# Patient Record
Sex: MALE | Race: BLACK OR AFRICAN AMERICAN | NOT HISPANIC OR LATINO | Employment: FULL TIME | URBAN - METROPOLITAN AREA
[De-identification: names, ages, dates, MRNs, and addresses within clinical notes are randomized per-mention and may not be internally consistent; named-entity substitution may affect disease eponyms.]

---

## 2017-04-06 ENCOUNTER — ALLSCRIPTS OFFICE VISIT (OUTPATIENT)
Dept: OTHER | Facility: OTHER | Age: 62
End: 2017-04-06

## 2017-04-06 DIAGNOSIS — R73.01 IMPAIRED FASTING GLUCOSE: ICD-10-CM

## 2017-04-06 DIAGNOSIS — K62.5 HEMORRHAGE OF ANUS AND RECTUM: ICD-10-CM

## 2017-04-06 DIAGNOSIS — D64.9 ANEMIA: ICD-10-CM

## 2017-04-06 DIAGNOSIS — R63.4 ABNORMAL WEIGHT LOSS: ICD-10-CM

## 2017-04-06 DIAGNOSIS — Z12.5 ENCOUNTER FOR SCREENING FOR MALIGNANT NEOPLASM OF PROSTATE: ICD-10-CM

## 2017-04-06 DIAGNOSIS — R68.81 EARLY SATIETY: ICD-10-CM

## 2017-04-06 DIAGNOSIS — R79.89 OTHER SPECIFIED ABNORMAL FINDINGS OF BLOOD CHEMISTRY: ICD-10-CM

## 2017-04-06 DIAGNOSIS — A04.8 OTHER SPECIFIED BACTERIAL INTESTINAL INFECTIONS: ICD-10-CM

## 2017-04-10 ENCOUNTER — TRANSCRIBE ORDERS (OUTPATIENT)
Dept: ADMINISTRATIVE | Facility: HOSPITAL | Age: 62
End: 2017-04-10

## 2017-04-10 ENCOUNTER — APPOINTMENT (OUTPATIENT)
Dept: LAB | Facility: MEDICAL CENTER | Age: 62
End: 2017-04-10
Payer: COMMERCIAL

## 2017-04-10 ENCOUNTER — ALLSCRIPTS OFFICE VISIT (OUTPATIENT)
Dept: OTHER | Facility: OTHER | Age: 62
End: 2017-04-10

## 2017-04-10 DIAGNOSIS — R63.4 ABNORMAL WEIGHT LOSS: ICD-10-CM

## 2017-04-10 DIAGNOSIS — R73.01 IMPAIRED FASTING GLUCOSE: ICD-10-CM

## 2017-04-10 DIAGNOSIS — R68.81 EARLY SATIETY: ICD-10-CM

## 2017-04-10 DIAGNOSIS — Z12.5 ENCOUNTER FOR SCREENING FOR MALIGNANT NEOPLASM OF PROSTATE: ICD-10-CM

## 2017-04-10 DIAGNOSIS — K62.5 HEMORRHAGE OF ANUS AND RECTUM: ICD-10-CM

## 2017-04-10 LAB
ALBUMIN SERPL BCP-MCNC: 3.3 G/DL (ref 3.5–5)
ALP SERPL-CCNC: 87 U/L (ref 46–116)
ALT SERPL W P-5'-P-CCNC: 27 U/L (ref 12–78)
ANION GAP SERPL CALCULATED.3IONS-SCNC: 3 MMOL/L (ref 4–13)
AST SERPL W P-5'-P-CCNC: 15 U/L (ref 5–45)
BASOPHILS # BLD MANUAL: 0.13 THOUSAND/UL (ref 0–0.1)
BASOPHILS NFR MAR MANUAL: 2 % (ref 0–1)
BILIRUB SERPL-MCNC: 0.7 MG/DL (ref 0.2–1)
BUN SERPL-MCNC: 11 MG/DL (ref 5–25)
CALCIUM SERPL-MCNC: 10 MG/DL (ref 8.3–10.1)
CHLORIDE SERPL-SCNC: 105 MMOL/L (ref 100–108)
CO2 SERPL-SCNC: 31 MMOL/L (ref 21–32)
CREAT SERPL-MCNC: 1.51 MG/DL (ref 0.6–1.3)
EOSINOPHIL # BLD MANUAL: 0.26 THOUSAND/UL (ref 0–0.4)
EOSINOPHIL NFR BLD MANUAL: 4 % (ref 0–6)
ERYTHROCYTE [DISTWIDTH] IN BLOOD BY AUTOMATED COUNT: 15 % (ref 11.6–15.1)
GFR SERPL CREATININE-BSD FRML MDRD: 57.4 ML/MIN/1.73SQ M
GLUCOSE P FAST SERPL-MCNC: 93 MG/DL (ref 65–99)
HCT VFR BLD AUTO: 39.6 % (ref 36.5–49.3)
HGB BLD-MCNC: 12.9 G/DL (ref 12–17)
LYMPHOCYTES # BLD AUTO: 2.06 THOUSAND/UL (ref 0.6–4.47)
LYMPHOCYTES # BLD AUTO: 32 % (ref 14–44)
MCH RBC QN AUTO: 26.9 PG (ref 26.8–34.3)
MCHC RBC AUTO-ENTMCNC: 32.6 G/DL (ref 31.4–37.4)
MCV RBC AUTO: 83 FL (ref 82–98)
MONOCYTES # BLD AUTO: 1.1 THOUSAND/UL (ref 0–1.22)
MONOCYTES NFR BLD: 17 % (ref 4–12)
NEUTROPHILS # BLD MANUAL: 2.64 THOUSAND/UL (ref 1.85–7.62)
NEUTS SEG NFR BLD AUTO: 41 % (ref 43–75)
NRBC BLD AUTO-RTO: 0 /100 WBCS
PLATELET # BLD AUTO: 221 THOUSANDS/UL (ref 149–390)
PLATELET BLD QL SMEAR: ADEQUATE
PMV BLD AUTO: 10 FL (ref 8.9–12.7)
POTASSIUM SERPL-SCNC: 4 MMOL/L (ref 3.5–5.3)
PROT SERPL-MCNC: 7 G/DL (ref 6.4–8.2)
PSA SERPL-MCNC: 0.3 NG/ML (ref 0–4)
RBC # BLD AUTO: 4.8 MILLION/UL (ref 3.88–5.62)
RBC MORPH BLD: NORMAL
SODIUM SERPL-SCNC: 139 MMOL/L (ref 136–145)
TSH SERPL DL<=0.05 MIU/L-ACNC: 0.55 UIU/ML (ref 0.36–3.74)
VARIANT LYMPHS # BLD AUTO: 4 %
WBC # BLD AUTO: 6.45 THOUSAND/UL (ref 4.31–10.16)

## 2017-04-10 PROCEDURE — 85007 BL SMEAR W/DIFF WBC COUNT: CPT

## 2017-04-10 PROCEDURE — 84443 ASSAY THYROID STIM HORMONE: CPT

## 2017-04-10 PROCEDURE — 85027 COMPLETE CBC AUTOMATED: CPT

## 2017-04-10 PROCEDURE — 80053 COMPREHEN METABOLIC PANEL: CPT

## 2017-04-10 PROCEDURE — 36415 COLL VENOUS BLD VENIPUNCTURE: CPT

## 2017-04-10 PROCEDURE — G0103 PSA SCREENING: HCPCS

## 2017-04-12 ENCOUNTER — GENERIC CONVERSION - ENCOUNTER (OUTPATIENT)
Dept: OTHER | Facility: OTHER | Age: 62
End: 2017-04-12

## 2017-04-12 RX ORDER — OMEPRAZOLE 20 MG/1
20 TABLET, DELAYED RELEASE ORAL AS NEEDED
COMMUNITY
End: 2018-06-05

## 2017-04-17 ENCOUNTER — HOSPITAL ENCOUNTER (OUTPATIENT)
Facility: MEDICAL CENTER | Age: 62
Setting detail: OUTPATIENT SURGERY
Discharge: HOME | End: 2017-04-17
Attending: INTERNAL MEDICINE | Admitting: INTERNAL MEDICINE
Payer: COMMERCIAL

## 2017-04-17 ENCOUNTER — ANESTHESIA EVENT (OUTPATIENT)
Dept: GASTROENTEROLOGY | Facility: MEDICAL CENTER | Age: 62
End: 2017-04-17
Payer: COMMERCIAL

## 2017-04-17 ENCOUNTER — ANESTHESIA (OUTPATIENT)
Dept: GASTROENTEROLOGY | Facility: MEDICAL CENTER | Age: 62
End: 2017-04-17
Payer: COMMERCIAL

## 2017-04-17 ENCOUNTER — GENERIC CONVERSION - ENCOUNTER (OUTPATIENT)
Dept: GASTROENTEROLOGY | Facility: MEDICAL CENTER | Age: 62
End: 2017-04-17

## 2017-04-17 VITALS
HEART RATE: 60 BPM | DIASTOLIC BLOOD PRESSURE: 66 MMHG | BODY MASS INDEX: 22.46 KG/M2 | RESPIRATION RATE: 16 BRPM | HEIGHT: 74 IN | TEMPERATURE: 96.6 F | OXYGEN SATURATION: 99 % | WEIGHT: 175 LBS | SYSTOLIC BLOOD PRESSURE: 105 MMHG

## 2017-04-17 DIAGNOSIS — K21.9 GASTRO-ESOPHAGEAL REFLUX DISEASE WITHOUT ESOPHAGITIS: ICD-10-CM

## 2017-04-17 DIAGNOSIS — K63.5 POLYP OF COLON: ICD-10-CM

## 2017-04-17 PROCEDURE — 88305 TISSUE EXAM BY PATHOLOGIST: CPT | Performed by: INTERNAL MEDICINE

## 2017-04-17 PROCEDURE — 88112 CYTOPATH CELL ENHANCE TECH: CPT | Performed by: INTERNAL MEDICINE

## 2017-04-17 PROCEDURE — 88342 IMHCHEM/IMCYTCHM 1ST ANTB: CPT | Performed by: INTERNAL MEDICINE

## 2017-04-17 PROCEDURE — 88313 SPECIAL STAINS GROUP 2: CPT | Performed by: INTERNAL MEDICINE

## 2017-04-17 RX ORDER — PROPOFOL 10 MG/ML
INJECTION, EMULSION INTRAVENOUS CONTINUOUS PRN
Status: DISCONTINUED | OUTPATIENT
Start: 2017-04-17 | End: 2017-04-17 | Stop reason: SURG

## 2017-04-17 RX ORDER — PROPOFOL 10 MG/ML
INJECTION, EMULSION INTRAVENOUS AS NEEDED
Status: DISCONTINUED | OUTPATIENT
Start: 2017-04-17 | End: 2017-04-17 | Stop reason: SURG

## 2017-04-17 RX ORDER — SODIUM CHLORIDE 9 MG/ML
125 INJECTION, SOLUTION INTRAVENOUS CONTINUOUS
Status: DISCONTINUED | OUTPATIENT
Start: 2017-04-17 | End: 2017-04-17 | Stop reason: HOSPADM

## 2017-04-17 RX ORDER — ONDANSETRON 2 MG/ML
4 INJECTION INTRAMUSCULAR; INTRAVENOUS ONCE
Status: DISCONTINUED | OUTPATIENT
Start: 2017-04-17 | End: 2017-04-17 | Stop reason: HOSPADM

## 2017-04-17 RX ADMIN — SODIUM CHLORIDE 125 ML/HR: 0.9 INJECTION, SOLUTION INTRAVENOUS at 10:16

## 2017-04-17 RX ADMIN — PROPOFOL 50 MG: 10 INJECTION, EMULSION INTRAVENOUS at 10:45

## 2017-04-17 RX ADMIN — PROPOFOL 50 MG: 10 INJECTION, EMULSION INTRAVENOUS at 10:46

## 2017-04-17 RX ADMIN — PROPOFOL 50 MG: 10 INJECTION, EMULSION INTRAVENOUS at 10:47

## 2017-04-17 RX ADMIN — PROPOFOL 140 MCG/KG/MIN: 10 INJECTION, EMULSION INTRAVENOUS at 10:45

## 2017-04-17 RX ADMIN — LIDOCAINE HYDROCHLORIDE 60 MG: 20 INJECTION, SOLUTION INTRAVENOUS at 10:45

## 2017-04-22 ENCOUNTER — GENERIC CONVERSION - ENCOUNTER (OUTPATIENT)
Dept: OTHER | Facility: OTHER | Age: 62
End: 2017-04-22

## 2017-05-09 ENCOUNTER — APPOINTMENT (OUTPATIENT)
Dept: LAB | Facility: MEDICAL CENTER | Age: 62
End: 2017-05-09
Payer: COMMERCIAL

## 2017-05-09 DIAGNOSIS — D64.9 ANEMIA: ICD-10-CM

## 2017-05-09 DIAGNOSIS — R79.89 OTHER SPECIFIED ABNORMAL FINDINGS OF BLOOD CHEMISTRY: ICD-10-CM

## 2017-05-09 LAB
ALBUMIN SERPL BCP-MCNC: 3.4 G/DL (ref 3.5–5)
ANION GAP SERPL CALCULATED.3IONS-SCNC: 5 MMOL/L (ref 4–13)
BASOPHILS # BLD AUTO: 0.04 THOUSANDS/ΜL (ref 0–0.1)
BASOPHILS NFR BLD AUTO: 1 % (ref 0–1)
BUN SERPL-MCNC: 10 MG/DL (ref 5–25)
CALCIUM ALBUM COR SERPL-MCNC: 10.8 MG/DL (ref 8.3–10.1)
CALCIUM SERPL-MCNC: 10.3 MD/DL (ref 8.3–10.1)
CALCIUM SERPL-MCNC: 10.3 MG/DL (ref 8.3–10.1)
CHLORIDE SERPL-SCNC: 106 MMOL/L (ref 100–108)
CO2 SERPL-SCNC: 29 MMOL/L (ref 21–32)
CREAT SERPL-MCNC: 1.25 MG/DL (ref 0.6–1.3)
EOSINOPHIL # BLD AUTO: 0.16 THOUSAND/ΜL (ref 0–0.61)
EOSINOPHIL NFR BLD AUTO: 4 % (ref 0–6)
ERYTHROCYTE [DISTWIDTH] IN BLOOD BY AUTOMATED COUNT: 14.7 % (ref 11.6–15.1)
GFR SERPL CREATININE-BSD FRML MDRD: >60 ML/MIN/1.73SQ M
GLUCOSE P FAST SERPL-MCNC: 87 MG/DL (ref 65–99)
HCT VFR BLD AUTO: 39.9 % (ref 36.5–49.3)
HGB BLD-MCNC: 12.8 G/DL (ref 12–17)
LYMPHOCYTES # BLD AUTO: 1.43 THOUSANDS/ΜL (ref 0.6–4.47)
LYMPHOCYTES NFR BLD AUTO: 31 % (ref 14–44)
MCH RBC QN AUTO: 26.2 PG (ref 26.8–34.3)
MCHC RBC AUTO-ENTMCNC: 32.1 G/DL (ref 31.4–37.4)
MCV RBC AUTO: 82 FL (ref 82–98)
MONOCYTES # BLD AUTO: 0.61 THOUSAND/ΜL (ref 0.17–1.22)
MONOCYTES NFR BLD AUTO: 13 % (ref 4–12)
NEUTROPHILS # BLD AUTO: 2.37 THOUSANDS/ΜL (ref 1.85–7.62)
NEUTS SEG NFR BLD AUTO: 51 % (ref 43–75)
NRBC BLD AUTO-RTO: 0 /100 WBCS
PLATELET # BLD AUTO: 199 THOUSANDS/UL (ref 149–390)
PMV BLD AUTO: 10.3 FL (ref 8.9–12.7)
POTASSIUM SERPL-SCNC: 3.8 MMOL/L (ref 3.5–5.3)
RBC # BLD AUTO: 4.88 MILLION/UL (ref 3.88–5.62)
SODIUM SERPL-SCNC: 140 MMOL/L (ref 136–145)
WBC # BLD AUTO: 4.61 THOUSAND/UL (ref 4.31–10.16)

## 2017-05-09 PROCEDURE — 80048 BASIC METABOLIC PNL TOTAL CA: CPT

## 2017-05-09 PROCEDURE — 36415 COLL VENOUS BLD VENIPUNCTURE: CPT

## 2017-05-09 PROCEDURE — 82040 ASSAY OF SERUM ALBUMIN: CPT

## 2017-05-09 PROCEDURE — 82310 ASSAY OF CALCIUM: CPT

## 2017-05-09 PROCEDURE — 85025 COMPLETE CBC W/AUTO DIFF WBC: CPT

## 2017-05-11 ENCOUNTER — GENERIC CONVERSION - ENCOUNTER (OUTPATIENT)
Dept: OTHER | Facility: OTHER | Age: 62
End: 2017-05-11

## 2017-05-11 DIAGNOSIS — R79.89 OTHER SPECIFIED ABNORMAL FINDINGS OF BLOOD CHEMISTRY: ICD-10-CM

## 2017-05-11 DIAGNOSIS — D64.9 ANEMIA: ICD-10-CM

## 2017-05-15 ENCOUNTER — ALLSCRIPTS OFFICE VISIT (OUTPATIENT)
Dept: OTHER | Facility: OTHER | Age: 62
End: 2017-05-15

## 2017-06-08 DIAGNOSIS — D64.9 ANEMIA: ICD-10-CM

## 2017-06-08 DIAGNOSIS — R79.89 OTHER SPECIFIED ABNORMAL FINDINGS OF BLOOD CHEMISTRY: ICD-10-CM

## 2017-11-02 ENCOUNTER — GENERIC CONVERSION - ENCOUNTER (OUTPATIENT)
Dept: OTHER | Facility: OTHER | Age: 62
End: 2017-11-02

## 2017-12-14 ENCOUNTER — TRANSCRIBE ORDERS (OUTPATIENT)
Dept: ADMINISTRATIVE | Facility: HOSPITAL | Age: 62
End: 2017-12-14

## 2017-12-14 ENCOUNTER — APPOINTMENT (OUTPATIENT)
Dept: LAB | Facility: MEDICAL CENTER | Age: 62
End: 2017-12-14
Payer: COMMERCIAL

## 2017-12-14 DIAGNOSIS — K40.90 INGUINAL HERNIA WITHOUT OBSTRUCTION OR GANGRENE, RECURRENCE NOT SPECIFIED, UNSPECIFIED LATERALITY: Primary | ICD-10-CM

## 2017-12-14 DIAGNOSIS — K40.90 INGUINAL HERNIA WITHOUT OBSTRUCTION OR GANGRENE, RECURRENCE NOT SPECIFIED, UNSPECIFIED LATERALITY: ICD-10-CM

## 2017-12-14 LAB
ALBUMIN SERPL BCP-MCNC: 3.4 G/DL (ref 3.5–5)
ANION GAP SERPL CALCULATED.3IONS-SCNC: 2 MMOL/L (ref 4–13)
BASOPHILS # BLD AUTO: 0.03 THOUSANDS/ΜL (ref 0–0.1)
BASOPHILS NFR BLD AUTO: 0 % (ref 0–1)
BUN SERPL-MCNC: 12 MG/DL (ref 5–25)
CALCIUM ALBUM COR SERPL-MCNC: 11 MG/DL (ref 8.3–10.1)
CALCIUM SERPL-MCNC: 10.5 MG/DL (ref 8.3–10.1)
CALCIUM SERPL-MCNC: 10.5 MG/DL (ref 8.3–10.1)
CHLORIDE SERPL-SCNC: 108 MMOL/L (ref 100–108)
CO2 SERPL-SCNC: 28 MMOL/L (ref 21–32)
CREAT SERPL-MCNC: 1.26 MG/DL (ref 0.6–1.3)
EOSINOPHIL # BLD AUTO: 0.22 THOUSAND/ΜL (ref 0–0.61)
EOSINOPHIL NFR BLD AUTO: 3 % (ref 0–6)
ERYTHROCYTE [DISTWIDTH] IN BLOOD BY AUTOMATED COUNT: 13.9 % (ref 11.6–15.1)
GFR SERPL CREATININE-BSD FRML MDRD: 71 ML/MIN/1.73SQ M
GLUCOSE SERPL-MCNC: 101 MG/DL (ref 65–140)
HCT VFR BLD AUTO: 44.1 % (ref 36.5–49.3)
HGB BLD-MCNC: 14.9 G/DL (ref 12–17)
LYMPHOCYTES # BLD AUTO: 2.46 THOUSANDS/ΜL (ref 0.6–4.47)
LYMPHOCYTES NFR BLD AUTO: 30 % (ref 14–44)
MCH RBC QN AUTO: 29.6 PG (ref 26.8–34.3)
MCHC RBC AUTO-ENTMCNC: 33.8 G/DL (ref 31.4–37.4)
MCV RBC AUTO: 88 FL (ref 82–98)
MONOCYTES # BLD AUTO: 0.69 THOUSAND/ΜL (ref 0.17–1.22)
MONOCYTES NFR BLD AUTO: 8 % (ref 4–12)
NEUTROPHILS # BLD AUTO: 4.8 THOUSANDS/ΜL (ref 1.85–7.62)
NEUTS SEG NFR BLD AUTO: 59 % (ref 43–75)
NRBC BLD AUTO-RTO: 0 /100 WBCS
PLATELET # BLD AUTO: 273 THOUSANDS/UL (ref 149–390)
PMV BLD AUTO: 9.6 FL (ref 8.9–12.7)
POTASSIUM SERPL-SCNC: 4.3 MMOL/L (ref 3.5–5.3)
RBC # BLD AUTO: 5.03 MILLION/UL (ref 3.88–5.62)
SODIUM SERPL-SCNC: 138 MMOL/L (ref 136–145)
WBC # BLD AUTO: 8.22 THOUSAND/UL (ref 4.31–10.16)

## 2017-12-14 PROCEDURE — 82310 ASSAY OF CALCIUM: CPT

## 2017-12-14 PROCEDURE — 82040 ASSAY OF SERUM ALBUMIN: CPT

## 2017-12-14 PROCEDURE — 80048 BASIC METABOLIC PNL TOTAL CA: CPT

## 2017-12-14 PROCEDURE — 36415 COLL VENOUS BLD VENIPUNCTURE: CPT

## 2017-12-14 PROCEDURE — 85025 COMPLETE CBC W/AUTO DIFF WBC: CPT

## 2017-12-21 ENCOUNTER — GENERIC CONVERSION - ENCOUNTER (OUTPATIENT)
Dept: OTHER | Facility: OTHER | Age: 62
End: 2017-12-21

## 2017-12-21 ENCOUNTER — APPOINTMENT (OUTPATIENT)
Dept: LAB | Age: 62
End: 2017-12-21
Payer: COMMERCIAL

## 2017-12-21 DIAGNOSIS — K40.90 INGUINAL HERNIA WITHOUT OBSTRUCTION OR GANGRENE, RECURRENCE NOT SPECIFIED, UNSPECIFIED LATERALITY: ICD-10-CM

## 2017-12-21 PROCEDURE — 93005 ELECTROCARDIOGRAM TRACING: CPT

## 2017-12-22 LAB
ATRIAL RATE: 71 BPM
P AXIS: 80 DEGREES
PR INTERVAL: 178 MS
QRS AXIS: 40 DEGREES
QRSD INTERVAL: 82 MS
QT INTERVAL: 370 MS
QTC INTERVAL: 402 MS
T WAVE AXIS: 24 DEGREES
VENTRICULAR RATE: 71 BPM

## 2018-01-02 NOTE — H&P
History and Physical -General Surgical Care   Fernanda Brewster 65 y o  male MRN: [id number]  Unit/Bed#:  Encounter: [id number]       Principal Problem:  Right inguinal hernia    HPI: Fernanda Brewster is a 65y o  year old male who presents with right inguinal hernia  He noticed a bulge in mid November  He has with a small amount of pain associated with it  More noticeable upon standing  Review of Systems    Historical Information   Past Medical History:   Diagnosis Date    Acid reflux     Osteoarthritis     Rectal bleeding      Past Surgical History:   Procedure Laterality Date    HERNIA REPAIR      IN COLONOSCOPY FLX DX W/COLLJ SPEC WHEN PFRMD N/A 4/17/2017    Procedure: EGD AND COLONOSCOPY;  Surgeon: Tommie Holstein, MD;  Location: [facility]; Service: Gastroenterology    ROTATOR CUFF REPAIR Left      Social History   History   Alcohol Use    Yes     History   Drug Use No     History   Smoking Status    Never Smoker   Smokeless Tobacco    Not on file     No family history on file  Meds/Allergies     No prescriptions prior to admission  No current facility-administered medications for this encounter  No Known Allergies        There were no vitals taken for this visit  No intake or output data in the 24 hours ending 01/02/18 1147    PHYSICAL EXAM  General appearance: alert, appears stated age and cooperative  Lungs: clear to auscultation bilaterally  Heart: regular rate and rhythm, S1, S2 normal, no murmur, click, rub or gallop  Abdomen: soft, non-tender; bowel sounds normal; no masses,  no organomegaly  Reducible right inguinal hernia on examination  Rectal: deferred  Skin: Skin color, texture, turgor normal  No rashes or lesions    Lab Results:   No visits with results within 1 Day(s) from this visit     Latest known visit with results is:   Appointment on 12/21/2017   Component Date Value    Ventricular Rate 12/21/2017 71     Atrial Rate 12/21/2017 71     IN Interval 12/21/2017 178     QRSD Interval 12/21/2017 82     QT Interval 12/21/2017 370     QTC Interval 12/21/2017 402     P Axis 12/21/2017 80     QRS Axis 12/21/2017 40     T Wave Axis 12/21/2017 24      Imaging Studies:     ASSESSMENT:  Right inguinal hernia    PLAN:  Risks and benefits for a right inguinal hernia repair including the potential for spermatic cord injury, hernia recurrence, and chronic pain issues were discussed with him and he agrees to proceed  Counseling / Coordination of Care  Total time spent today  30 minutes  Greater than 50% of total time was spent with the patient and / or family counseling and / or coordination of care

## 2018-01-03 ENCOUNTER — ANESTHESIA EVENT (OUTPATIENT)
Dept: PERIOP | Facility: HOSPITAL | Age: 63
End: 2018-01-03
Payer: COMMERCIAL

## 2018-01-04 ENCOUNTER — HOSPITAL ENCOUNTER (OUTPATIENT)
Facility: HOSPITAL | Age: 63
Setting detail: OUTPATIENT SURGERY
Discharge: HOME | End: 2018-01-04
Attending: SURGERY | Admitting: SURGERY
Payer: COMMERCIAL

## 2018-01-04 ENCOUNTER — ANESTHESIA (OUTPATIENT)
Dept: PERIOP | Facility: HOSPITAL | Age: 63
End: 2018-01-04
Payer: COMMERCIAL

## 2018-01-04 VITALS
BODY MASS INDEX: 23.1 KG/M2 | TEMPERATURE: 98.2 F | HEIGHT: 74 IN | RESPIRATION RATE: 18 BRPM | HEART RATE: 67 BPM | OXYGEN SATURATION: 100 % | WEIGHT: 180 LBS | DIASTOLIC BLOOD PRESSURE: 93 MMHG | SYSTOLIC BLOOD PRESSURE: 143 MMHG

## 2018-01-04 PROCEDURE — C1781 MESH (IMPLANTABLE): HCPCS | Performed by: SURGERY

## 2018-01-04 DEVICE — BARD MODIFIED KUGEL HERNIA PATCH
Type: IMPLANTABLE DEVICE | Site: INGUINAL | Status: FUNCTIONAL
Brand: BARD MODIFIED KUGEL HERNIA PATCH

## 2018-01-04 RX ORDER — FENTANYL CITRATE/PF 50 MCG/ML
25 SYRINGE (ML) INJECTION
Status: DISCONTINUED | OUTPATIENT
Start: 2018-01-04 | End: 2018-01-04 | Stop reason: HOSPADM

## 2018-01-04 RX ORDER — KETOROLAC TROMETHAMINE 30 MG/ML
INJECTION, SOLUTION INTRAMUSCULAR; INTRAVENOUS AS NEEDED
Status: DISCONTINUED | OUTPATIENT
Start: 2018-01-04 | End: 2018-01-04 | Stop reason: SURG

## 2018-01-04 RX ORDER — MIDAZOLAM HYDROCHLORIDE 1 MG/ML
INJECTION INTRAMUSCULAR; INTRAVENOUS AS NEEDED
Status: DISCONTINUED | OUTPATIENT
Start: 2018-01-04 | End: 2018-01-04 | Stop reason: SURG

## 2018-01-04 RX ORDER — OXYCODONE HYDROCHLORIDE AND ACETAMINOPHEN 5; 325 MG/1; MG/1
2 TABLET ORAL EVERY 4 HOURS PRN
Qty: 28 TABLET | Refills: 0 | Status: SHIPPED | OUTPATIENT
Start: 2018-01-04 | End: 2018-06-05

## 2018-01-04 RX ORDER — ONDANSETRON 2 MG/ML
4 INJECTION INTRAMUSCULAR; INTRAVENOUS EVERY 4 HOURS PRN
Status: DISCONTINUED | OUTPATIENT
Start: 2018-01-04 | End: 2018-01-04 | Stop reason: HOSPADM

## 2018-01-04 RX ORDER — GLYCOPYRROLATE 0.2 MG/ML
INJECTION INTRAMUSCULAR; INTRAVENOUS AS NEEDED
Status: DISCONTINUED | OUTPATIENT
Start: 2018-01-04 | End: 2018-01-04 | Stop reason: SURG

## 2018-01-04 RX ORDER — ONDANSETRON 2 MG/ML
4 INJECTION INTRAMUSCULAR; INTRAVENOUS ONCE AS NEEDED
Status: DISCONTINUED | OUTPATIENT
Start: 2018-01-04 | End: 2018-01-04 | Stop reason: HOSPADM

## 2018-01-04 RX ORDER — PROPOFOL 10 MG/ML
INJECTION, EMULSION INTRAVENOUS AS NEEDED
Status: DISCONTINUED | OUTPATIENT
Start: 2018-01-04 | End: 2018-01-04 | Stop reason: SURG

## 2018-01-04 RX ORDER — FENTANYL CITRATE 50 UG/ML
INJECTION, SOLUTION INTRAMUSCULAR; INTRAVENOUS AS NEEDED
Status: DISCONTINUED | OUTPATIENT
Start: 2018-01-04 | End: 2018-01-04 | Stop reason: SURG

## 2018-01-04 RX ORDER — SODIUM CHLORIDE, SODIUM LACTATE, POTASSIUM CHLORIDE, CALCIUM CHLORIDE 600; 310; 30; 20 MG/100ML; MG/100ML; MG/100ML; MG/100ML
125 INJECTION, SOLUTION INTRAVENOUS CONTINUOUS
Status: DISCONTINUED | OUTPATIENT
Start: 2018-01-04 | End: 2018-01-04 | Stop reason: HOSPADM

## 2018-01-04 RX ORDER — MORPHINE SULFATE 10 MG/ML
4 INJECTION, SOLUTION INTRAMUSCULAR; INTRAVENOUS
Status: DISCONTINUED | OUTPATIENT
Start: 2018-01-04 | End: 2018-01-04 | Stop reason: HOSPADM

## 2018-01-04 RX ORDER — OXYCODONE HYDROCHLORIDE AND ACETAMINOPHEN 5; 325 MG/1; MG/1
2 TABLET ORAL EVERY 4 HOURS PRN
Status: DISCONTINUED | OUTPATIENT
Start: 2018-01-04 | End: 2018-01-04 | Stop reason: HOSPADM

## 2018-01-04 RX ORDER — BUPIVACAINE HYDROCHLORIDE AND EPINEPHRINE 2.5; 5 MG/ML; UG/ML
INJECTION, SOLUTION EPIDURAL; INFILTRATION; INTRACAUDAL; PERINEURAL AS NEEDED
Status: DISCONTINUED | OUTPATIENT
Start: 2018-01-04 | End: 2018-01-04 | Stop reason: HOSPADM

## 2018-01-04 RX ORDER — SODIUM CHLORIDE 9 MG/ML
100 INJECTION, SOLUTION INTRAVENOUS CONTINUOUS
Status: DISCONTINUED | OUTPATIENT
Start: 2018-01-04 | End: 2018-01-04 | Stop reason: HOSPADM

## 2018-01-04 RX ORDER — ONDANSETRON 2 MG/ML
INJECTION INTRAMUSCULAR; INTRAVENOUS AS NEEDED
Status: DISCONTINUED | OUTPATIENT
Start: 2018-01-04 | End: 2018-01-04 | Stop reason: SURG

## 2018-01-04 RX ORDER — SODIUM CHLORIDE 9 MG/ML
INJECTION, SOLUTION INTRAVENOUS CONTINUOUS PRN
Status: DISCONTINUED | OUTPATIENT
Start: 2018-01-04 | End: 2018-01-04 | Stop reason: SURG

## 2018-01-04 RX ADMIN — PROPOFOL 200 MG: 10 INJECTION, EMULSION INTRAVENOUS at 07:40

## 2018-01-04 RX ADMIN — FENTANYL CITRATE 50 MCG: 50 INJECTION INTRAMUSCULAR; INTRAVENOUS at 07:42

## 2018-01-04 RX ADMIN — MIDAZOLAM HYDROCHLORIDE 2 MG: 1 INJECTION, SOLUTION INTRAMUSCULAR; INTRAVENOUS at 07:37

## 2018-01-04 RX ADMIN — GLYCOPYRROLATE 0.2 MG: 0.2 INJECTION, SOLUTION INTRAMUSCULAR; INTRAVENOUS at 07:40

## 2018-01-04 RX ADMIN — FENTANYL CITRATE 50 MCG: 50 INJECTION INTRAMUSCULAR; INTRAVENOUS at 07:47

## 2018-01-04 RX ADMIN — CEFAZOLIN SODIUM 2000 MG: 2 SOLUTION INTRAVENOUS at 07:37

## 2018-01-04 RX ADMIN — LIDOCAINE HYDROCHLORIDE 100 MG: 20 INJECTION, SOLUTION INTRAVENOUS at 07:40

## 2018-01-04 RX ADMIN — FENTANYL CITRATE 50 MCG: 50 INJECTION INTRAMUSCULAR; INTRAVENOUS at 07:54

## 2018-01-04 RX ADMIN — KETOROLAC TROMETHAMINE 30 MG: 30 INJECTION, SOLUTION INTRAMUSCULAR at 08:10

## 2018-01-04 RX ADMIN — SODIUM CHLORIDE: 0.9 INJECTION, SOLUTION INTRAVENOUS at 07:10

## 2018-01-04 RX ADMIN — DEXAMETHASONE SODIUM PHOSPHATE 10 MG: 10 INJECTION INTRAMUSCULAR; INTRAVENOUS at 07:40

## 2018-01-04 RX ADMIN — ONDANSETRON 4 MG: 2 INJECTION INTRAMUSCULAR; INTRAVENOUS at 07:45

## 2018-01-04 NOTE — DISCHARGE INSTRUCTIONS
Please call the office when you leave to schedule an appointment to be seen in 2-3 weeks    Activity:    Do not lift more than 25 pounds (a gallon of milk) for 1-2 weeks post-operatively                 Walking is encouraged  Normal daily activities including climbing steps are okay  Do not engage in strenuous activity or contact sports for 4 weeks post-operatively    Return to work:   Return to work to be discussed at first post-operative visit    Diet:   You may return to your normal heart healthy diet    Wound Care: Your wound is closed with histoacryl  It is okay to shower  Wash incision gently with soap and water and pat dry  Do not soak incisions in bath water or swim for two weeks  Do not apply any creams or ointments  Apply ice as needed to wound    Pain Medication:   Please take as directed  No driving while taking narcotic pain medications    Other:  If you have questions after discharge please call the office      If you have increased pain, fever >101 5, increased drainage, redness or a bad smell at your surgery site, please call us immediately or come directly to the Emergency Room

## 2018-01-04 NOTE — ANESTHESIA POSTPROCEDURE EVALUATION
Post-Op Assessment Note      CV Status:  Stable    Hydration Status:  Euvolemic    PONV Controlled:  Controlled    Airway Patency:  Patent    Post Op Vitals Reviewed: Yes          Staff: CRNA       Comments: sleeping vss report rn          BP      Temp     Pulse     Resp      SpO2

## 2018-01-04 NOTE — ANESTHESIA PREPROCEDURE EVALUATION
Review of Systems/Medical History      No history of anesthetic complications     Cardiovascular  Negative cardio ROS    Pulmonary  ,   Comment: Never smoker     GI/Hepatic    GERD well controlled,   Comment: etoh occas     Negative  ROS        Endo/Other  Negative endo/other ROS      GYN       Hematology  Negative hematology ROS      Musculoskeletal  Negative musculoskeletal ROS        Neurology  Negative neurology ROS      Psychology   Negative psychology ROS            Physical Exam    Airway    Mallampati score: II  TM Distance: >3 FB  Neck ROM: full     Dental   No notable dental hx     Cardiovascular  Comment: Negative ROS,     Pulmonary      Other Findings        Anesthesia Plan  ASA Score- 2     Anesthesia Type- general with ASA Monitors  Additional Monitors:   Airway Plan: LMA  Plan Factors-    Induction- intravenous  Postoperative Plan-     Informed Consent- Anesthetic plan and risks discussed with patient

## 2018-01-04 NOTE — OP NOTE
OPERATIVE REPORT  PATIENT NAME: Patti Lincoln    :  1955  MRN: [id number]  Pt Location: AN OR ROOM 01    SURGERY DATE: 2018    Surgeon(s) and Role:     * Hesham Veliz DO - Primary     * Nerissa Palmer MD - Assisting    Preop Diagnosis:  Inguinal hernia [K40 90]    Post-Op Diagnosis Codes:     * Inguinal hernia [K40 90]    Procedure(s) (LRB):  INGUINAL HERNIA REPAIR (Right) with 3 inch modified Kugel mesh    Specimen(s):  * No specimens in log *    Estimated Blood Loss:   Minimal    Drains:       Anesthesia Type:   General/local    Operative Indications:  Inguinal hernia [K40 90]      Operative Findings:  Indirect right inguinal hernia    Review of Systems/Medical History  No history of anesthetic complications     Cardiovascular  Negative cardio ROS  Pulmonary  ,   Comment: Never smoker   GI/Hepatic  GERD well controlled,   Comment: etoh occas   Negative  ROS    Endo/Other  Negative endo/other ROS  GYN   Hematology  Negative hematology ROS     Musculoskeletal  Negative musculoskeletal ROS    Neurology  Negative neurology ROS     Psychology   Negative psychology ROS          Height 74 inches weight 82 kg/180 lb BMI 23  ASA 2  Wound class 1    Complications:   None    Procedure and Technique:  The patient was brought into the operative suite and identified by visualization conversation by arm band  Athrombic pumps were placed  He was given preoperative antibiotics  Rightwas given preoperative antibiotics  inguinal region was then prepped and draped in a sterile fashion  A timeout was performed and it was assured that the prep was dry  Local was then instilled over the l right inguinal canal  A skin incision was made and the subcutaneous tissues were divided with hot cautery down to the external oblique fascia  Fascia was opened up through the external ring to the level of the internal ring a Connie retractor was placed for exposure   The cremasterics fibers were dissected off of the spermatic cord structures  Spermatic cord structures were encircled with a Penrose drain for control  An indirect hernia was noted and dissected out in a high fashion towards the internal ring  A 3 inch modified Kugel composite mesh was chosen  The round preperitoneal aspect was placed into the hernia defect  The tails were anchored to each side of the transversalis tissues with 2-0 Vicryl suture  The onlay mesh was then placed on the floor the canal and anchored with 2-0 Vicryl to the pubic tubercle shelving edge and conjoined tendon  I extended a cut from the superior aspect of the mesh down to the cord structures  The resultant 2 tails were brought around the cord and anchored to themselves as well as the underlying transversalis tissues with 2-0 Vicryl  This resulted in the creation of a new internal ring  The tails were trimmed and tucked under the external oblique fascia  Copious irrigation was carried out  External oblique fascia was reapproximated on top of the cord with a running 2-0 Vicryl suture  Irrigation was carried out  Russell's was then reapproximating using simple 2-0 Vicryl sutures  Skin incision was closed using 4-0 Monocryl and a subsequent care fashion  Wound was washed and dried and sterile Histoacryl was applied  It was assured that the testicle was in the scrotum at the completion of the case  The patient was awakened in the operating room and returned to recovery area in stable condition having tolerated the procedure well     I was present for the entire procedure   I was present for the entire procedure    Patient Disposition:  PACU     SIGNATURE: Yuridia Plata DO  DATE: January 4, 2018  TIME: 8:35 AM

## 2018-01-11 NOTE — PROGRESS NOTES
Assessment    1  Encounter for preventive health examination (V70 0) (Z00 00)    Plan  Hemorrhoid    · Anusol-HC 25 MG Rectal Suppository    Discussion/Summary  Impression: health maintenance visit  Currently, he eats a healthy diet and has an adequate exercise regimen  Prostate cancer screening: prostate cancer screening is current  Colorectal cancer screening: colorectal cancer screening is current  The risks and benefits of immunizations were discussed  Advice and education were given regarding nutrition, aerobic exercise, weight loss and cardiovascular risk reduction  Avoid NSAIDs  Extra Strength Tylenol for pain  consider colon rectal surgery evaluation for recurrent rectal bleeding  Possible side effects of new medications were reviewed with the patient/guardian today  The treatment plan was reviewed with the patient/guardian  The patient/guardian understands and agrees with the treatment plan      History of Present Illness  HM, Adult Male: The patient is being seen for a health maintenance evaluation  The last health maintenance visit was 1 year(s) ago  Social History: Household members include spouse  He is   Work status: working full time  The patient has never smoked cigarettes  He reports occasional alcohol use  General Health: The patient's health since the last visit is described as good  He has regular dental visits  He denies vision problems  He denies hearing loss  Lifestyle:  He consumes a diverse and healthy diet  He does not have any weight concerns  He exercises regularly  Screening: Prostate cancer screening includes last prostate-specific antigen testing 04/2017  Colorectal cancer screening includes last colonoscopy done 04/2017  Metabolic screening includes lipid profile performed 04/2017, glucose screening performed 04/2017 and thyroid function test performed 04/2017  Cardiovascular risk factors: family history of cardiovascular disease     General health risks: previous colon polyps  HPI: 62year old male, here for wellness exam  past medical history and active problems  see chart  medications reviewed  Labs 04/2017 see note  chemistry normal except for creatinine 1 51 increased from 1 32 in 2016  CBC Hgb 12 9  CBC 03/2016 Hgb 15  8  patient was seen 04/2017 with loss of appetite, early satiety and intermittent rectal bleeding  he had been on Ibuprofen 800 mg prn for osteoarthritis  he underwent EGD/colonoscopy 04/2017  EGD erosive gastritis  + duodenitis in duodenal bulb  + H pylori  he was treated with antibiotics and Omeprazole  colonoscopy polyps  + internal hemorrhoids  he stopped Ibuprofen  repeat labs this month  creatinine improved to 1 25  Hgb 12  8  rectal bleeding resolved with the use of suppositories  Review of Systems    Constitutional: 12 lb weight loss from 03/2016, but no recent weight gain and no recent weight loss  Eyes: no eyesight problems  Cardiovascular: no chest pain, no palpitations and no extremity edema  Respiratory: no cough, no orthopnea, no wheezing, no shortness of breath during exertion and no PND  Gastrointestinal: as noted in HPI, no abdominal pain, no nausea, no vomiting, no constipation and no diarrhea  Genitourinary: ED uses prn Viagra  04/2017 PSA 0 3, but no urinary hesitancy and no nocturia  Musculoskeletal: arthralgias and s/p left shoulder surgery 10/2016 for left rotator cuff tendon tear and labral tear , but as noted in HPI  Integumentary: no rashes and no skin lesions  Neurological: no headache and no dizziness  Psychiatric: no anxiety and no depression  Endocrine: no muscle weakness  Active Problems    1  Acid reflux (530 81) (K21 9)   2  Degeneration of cervical intervertebral disc (722 4) (M50 90)   3  Disc degeneration, lumbar (722 52) (M51 36)   4  H  pylori infection (041 86) (A04 8)   5  Hemorrhoid (455 6) (K64 9)   6  Male erectile dysfunction (607 84) (N52 9)   7   Osteoarthritis of right acromioclavicular joint (715 91) (M19 011)    Past Medical History    · History of Benign Polyps Of The Large Intestine (V12 72)   · History of Candida esophagitis (112 84) (B37 81)   · History of sickle cell trait (V12 3) (Z86 2)   · History of Impaired fasting glucose (790 21) (R73 01)   · History of Rectal bleeding (569 3) (K62 5)    Surgical History    · History of Inguinal Hernia Repair    Family History  Mother    · Denied: Family history of Colon cancer   · Denied: Family history of colonic polyps   · Denied: Family history of Crohn's disease   · Denied: Family history of liver disease  Father    · Denied: Family history of Colon cancer   · Family history of Diabetes Mellitus (V18 0)   · Denied: Family history of colonic polyps   · Denied: Family history of Crohn's disease   · Denied: Family history of liver disease  Paternal Cousin    · Denied: Family history of Crohn's disease  Family History    · Family history of Coronary Artery Disease (V17 49)   · Family history of Essential Hypertension    Social History    · Never A Smoker   · Social drinker (V49 89) (Z78 9)    Current Meds   1  Anusol-HC 25 MG Rectal Suppository; INSERT 1 SUPPOSITORY RECTALLY AT   BEDTIME AS NEEDED; Therapy: 79Cfr3776 to (Evaluate:25Vba6262)  Requested for: 53Jbt1098; Last   Rx:[id number] Ordered   2  Omeprazole 20 MG Oral Capsule Delayed Release; take 1 capsule daily; Therapy: 62Amg9018 to (Evaluate:17Jhj2340)  Requested for: 43Zqq8435; Last   Rx:20Hhb1995 Ordered    Allergies    1  No Known Drug Allergies    Vitals   Recorded: 36CUS2996 02:48PM   Temperature 98 F   Heart Rate 80   Respiration 16   Systolic 174   Diastolic 72   Height 6 ft    Weight 183 lb    BMI Calculated 24 82   BSA Calculated 2 05     Physical Exam    Constitutional   General appearance: No acute distress, well appearing and well nourished  Eyes   Conjunctiva and lids: No erythema, swelling or discharge      Pupils and irises: Equal, round, reactive to light  Ophthalmoscopic examination: Normal fundi and optic discs  Ears, Nose, Mouth, and Throat   Otoscopic examination: Tympanic membranes translucent with normal light reflex  Canals patent without erythema  Oropharynx: Normal with no erythema, edema, exudate or lesions  Neck   Neck: Supple, symmetric, trachea midline, no masses  Thyroid: Normal, no thyromegaly  There were no thyroid nodules  Pulmonary   Auscultation of lungs: Clear to auscultation  Cardiovascular   Auscultation of heart: Normal rate and rhythm, normal S1 and S2, no murmurs  Heart sounds: no gallop heard  Carotid pulses: 2+ bilaterally  no bruit heard over the right carotid and no bruit heard over the left carotid  Abdominal aorta: Normal   Abdominal aorta: no bruit heard  Examination of extremities for edema and/or varicosities: Normal     Abdomen   Abdomen: Non-tender, no masses  Liver and spleen: No hepatomegaly or splenomegaly  Lymphatic   Palpation of lymph nodes in neck: No lymphadenopathy  Musculoskeletal   Gait and station: Normal     Skin   Skin and subcutaneous tissue: Normal without rashes or lesions  Psychiatric   Mood and affect: Normal        Results/Data  (1) CBC/PLT/DIFF [id number] 07:10AM [facility]: [id number]     Test Name Result Flag Reference   WBC COUNT 4 61 Thousand/uL  4 31-10 16   RBC COUNT 4 88 Million/uL  3 88-5 62   HEMOGLOBIN 12 8 g/dL  12 0-17 0   HEMATOCRIT 39 9 %  36 5-49 3   MCV 82 fL  82-98   MCH 26 2 pg L 26 8-34 3   MCHC 32 1 g/dL  31 4-37 4   RDW 14 7 %  11 6-15 1   MPV 10 3 fL  8 9-12 7   PLATELET COUNT 219 Thousands/uL  149-390   nRBC AUTOMATED 0 /100 WBCs     NEUTROPHILS RELATIVE PERCENT 51 %  43-75   LYMPHOCYTES RELATIVE PERCENT 31 %  14-44   MONOCYTES RELATIVE PERCENT 13 % H 4-12   EOSINOPHILS RELATIVE PERCENT 4 %  0-6   BASOPHILS RELATIVE PERCENT 1 %  0-1   NEUTROPHILS ABSOLUTE COUNT 2 37 Thousands/? ??L  1 85-7 62 LYMPHOCYTES ABSOLUTE COUNT 1 43 Thousands/? ??L  0 60-4 47   MONOCYTES ABSOLUTE COUNT 0 61 Thousand/? ??L  0 17-1 22   EOSINOPHILS ABSOLUTE COUNT 0 16 Thousand/? ??L  0 00-0 61   BASOPHILS ABSOLUTE COUNT 0 04 Thousands/? ??L  0 00-0 10   - Patient Instructions: This bloodwork is non-fasting  Please drink two glasses of water morning of bloodwork  (1) BASIC METABOLIC PROFILE [id number] 07:10AM [place] [facility] Order Number: [id number]     Test Name Result Flag Reference   SODIUM 140 mmol/L  136-145   POTASSIUM 3 8 mmol/L  3 5-5 3   CHLORIDE 106 mmol/L  100-108   CARBON DIOXIDE 29 mmol/L  21-32   ANION GAP (CALC) 5 mmol/L  4-13   BLOOD UREA NITROGEN 10 mg/dL  5-25   CREATININE 1 25 mg/dL  0 60-1 30   Standardized to IDMS reference method   CALCIUM 10 3 mg/dL H 8 3-10 1   eGFR Non-African American      >60 0 ml/min/1 73sq [facility] Disease Education Program recommendations are as follows:  GFR calculation is accurate only with a steady state creatinine  Chronic Kidney disease less than 60 ml/min/1 73 sq  meters  Kidney failure less than 15 ml/min/1 73 sq  meters  GLUCOSE FASTING 87 mg/dL  65-99     EGD 32Ojp7718 01:38PM [facility]     Test Name Result Flag Reference   EGD 39002505       COLONOSCOPY 85Hpu7956 01:37PM [facility]     Test Name Result Flag Reference   Colonoscopy [id number]       (1) CBC/PLT/DIFF 99Djx7761 08:13AM [facility] Order Number: [id number]     Test Name Result Flag Reference   WBC COUNT 6 45 Thousand/uL  4 31-10 16   RBC COUNT 4 80 Million/uL  3 88-5 62   HEMOGLOBIN 12 9 g/dL  12 0-17 0   HEMATOCRIT 39 6 %  36 5-49 3   MCV 83 fL  82-98   MCH 26 9 pg  26 8-34 3   MCHC 32 6 g/dL  31 4-37 4   RDW 15 0 %  11 6-15 1   MPV 10 0 fL  8 9-12 7   PLATELET COUNT 293 Thousands/uL  149-390   nRBC AUTOMATED 0 /100 WBCs     This is an appended report  These results have been appended to a previously preliminary verified report  - Patient Instructions:  This bloodwork is non-fasting  Please drink two glasses of water morning of bloodwork  - Patient Instructions: This bloodwork is non-fasting  Please drink two glasses of water morning of bloodwork  This is an appended report  These results have been appended to a previously verified report  NEUTROPHILS - REL 41 % L 43-75   LYMPHOCYTES - REL 32 %  14-44   MONOCYTES - REL 17 % H 4-12   EOSINOPHILS - REL 4 %  0-6   BASOPHILS - REL 2 % H 0-1   ATYPICAL LYMPH 4 % H <=0   NEUTROPHILS ABS 2 64 Thousand/uL  1 85-7 62   LYMPHOTCYTES ABS 2 06 Thousand/uL  0 60-4 47   MONOCYTES ABS 1 10 Thousand/uL  0 00-1 22   EOSINOPHILS ABS 0 26 Thousand/uL  0 00-0 40   BASOPHILS ABS 0 13 Thousand/uL H 0 00-0 10   TOTAL COUNTED      RBC MORPHOLOGY Normal     PLT ESTIMATE Adequate  Adequate   - Patient Instructions: This bloodwork is non-fasting  Please drink two glasses of water morning of bloodwork  - Patient Instructions: This bloodwork is non-fasting  Please drink two glasses of water morning of bloodwork  (1) COMPREHENSIVE METABOLIC PANEL [id number] 57:19JE [facility] Order Number: [id number]     Test Name Result Flag Reference   SODIUM 139 mmol/L  136-145   POTASSIUM 4 0 mmol/L  3 5-5 3   CHLORIDE 105 mmol/L  100-108   CARBON DIOXIDE 31 mmol/L  21-32   ANION GAP (CALC) 3 mmol/L L 4-13   BLOOD UREA NITROGEN 11 mg/dL  5-25   CREATININE 1 51 mg/dL H 0 60-1 30   Standardized to IDMS reference method   CALCIUM 10 0 mg/dL  8 3-10 1   BILI, TOTAL 0 70 mg/dL  0 20-1 00   ALK PHOSPHATAS 87 U/L     ALT (SGPT) 27 U/L  12-78   AST(SGOT) 15 U/L  5-45   ALBUMIN 3 3 g/dL L 3 5-5 0   Sampling error, corrected result faxed to 's office  This is a corrected result  Previous result was 3 1 g/dL on 4/10/2017 at 1406 EDT   TOTAL PROTEIN 7 0 g/dL  6 4-8 2   eGFR Non-African American 57 4 ml/min/1 73sq m     - Patient Instructions: This bloodwork is non-fasting  Please drink two glasses of water morning of bloodwork  National Kidney Disease Education Program recommendations are as follows:  GFR calculation is accurate only with a steady state creatinine  Chronic Kidney disease less than 60 ml/min/1 73 sq  meters  Kidney failure less than 15 ml/min/1 73 sq  meters  GLUCOSE FASTING 93 mg/dL  65-99     (1) TSH 58Xbt0094 08:13AM [facility] Order Number: [id number]     Test Name Result Flag Reference   TSH 0 554 uIU/mL  0 358-3 740   - Patient Instructions: This bloodwork is non-fasting  Please drink two glasses of water morning of bloodwork  - Patient Instructions: This bloodwork is non-fasting  Please drink two glasses of water morning of bloodwork  Patients undergoing fluorescein dye angiography may retain small amounts of fluorescein in the body for 48-72 hours post procedure  Samples containing fluorescein can produce falsely depressed TSH values  If the patient had this procedure,a specimen should be resubmitted post fluorescein clearance  (1) PSA (SCREEN) (Dx V76 44 Screen for Prostate Cancer) 66Wkn7635 08:13AM [facility] Order Number: [id number]     Test Name Result Flag Reference   PROSTATE SPECIFIC ANTIGEN 0 3 ng/mL  0 0-4 0   American Urological Association Guidelines define biochemical recurrence of prostate cancer as a detectable or rising PSA value post-radical prostatectomy that is greater than or equal to 0 2 ng/mL with a second confirmatory level of greater than or equal to 0 2 ng/mL  - Patient Instructions: This test is non-fasting  Please drink two glasses of water morning of bloodwork  - Patient Instructions: This test is non-fasting  Please drink two glasses of water morning of bloodwork  * MRI SHOULDER LEFT WO CONTRAST 01Apr2016 05:46PM Kristel Oro     Test Name Result Flag Reference   MRI SHOULDER LEFT WO CONTRAST (Report)     This is a summary report  The complete report is available in the patient's medical record   If you cannot access the medical record, please contact the sending organization for a detailed fax or copy  MRI LEFT SHOULDER     INDICATION: Left shoulder pain  Patient fell over one month ago  COMPARISON: Left shoulder plain films from 3/4/2016  TECHNIQUE:  The following MR sequences were obtained of the left shoulder: Localizer, axial GRE/PD fat sat, oblique coronal T2 fat sat, oblique sagittal T1/T2 fat sat  Images were acquired on a 1 5 Vivian unit  Gadolinium was not used  FINDINGS:     SUBCUTANEOUS TISSUES: Normal     JOINT EFFUSION: None  ACROMION PROCESS: There is a large subacromial spur  (Series 5 image 14 )     ROTATOR CUFF: There is a full-thickness anterior leading edge tear of the supraspinatus, spanning 1 7 cm anterior posterior, with torn tendon edge retracted minimally by 0 9 cm  No muscle atrophy  (Series 4 images 11 through 13, series 6 image 19 )     The infraspinatus, subscapularis, and teres minor are normal      SUBACROMIAL/SUBDELTOID BURSA: There is a small subacromial/subdeltoid bursitis  LONG HEAD OF BICEPS TENDON: There is mild long head of biceps tenosynovitis without tear  GLENOID LABRUM: There is a SLAP tear of the glenoid labrum extending from the posterior-superior labrum at the 1 o'clock position, counterclockwise to the anterior-superior labrum at the 11 o'clock position  (Series 4 images 11 through 13 ) There is a    separate small posterior glenoid labral tear (series 3 image 15 )     GLENOHUMERAL JOINT: Intact  ACROMIOCLAVICULAR JOINT: There is moderate osteoarthritis  BONE MARROW SIGNAL: Normal        IMPRESSION:     There is a full-thickness anterior leading edge tear of the supraspinatus, spanning 1 7 cm anterior posterior, with torn tendon edge retracted minimally by 0 9 cm  No muscle atrophy   (Series 4 images 11 through 13, series 6 image 19 )     Moderate acromioclavicular joint osteoarthritis, and large subacromial spur (series 5 image 14 )     There is a SLAP tear of the glenoid labrum extending from the posterior-superior labrum at the 1 o'clock position, counterclockwise to the anterior-superior labrum at the 11 o'clock position  (Series 4 images 11 through 13 )      There is a separate small posterior glenoid labral tear (series 3 image 15 )       Workstation performed: [id number]     Signed by:   Nelly Quijano MD   4/4/16       Health Management  Health Maintenance   COLONOSCOPY; every 3 years; Last 35Ixd1355; Next Due: 05Tdd2357;  Active    Signatures   Electronically signed by : VANCE Dueñas ; May 15 2017  8:27PM EST                       (Author)

## 2018-01-11 NOTE — RESULT NOTES
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- needs triple therapy for treatment of this  I will prescribe this    EGD was postiive for Candida esophagitis on the brushing- This will also need treatment      first we willl treat the candida was 14 days and than H Pylori for 14 days after that    Patient was stop all meds after this including the PPI therapy and we can check eradication 1 month after with the stool test  Please let the patient know the plan  I will place all the orders  Thanks     Verified Results  (1) NON-GYNECOLOGIC CYTOLOGY 94Yar8491 10:53AM Robbie Cisneros     Test Name Result Flag Reference   LAB AP CASE REPORT (Report)     Non-gynecologic Cytology             Case: [id number]                  Authorizing Provider: Goran Alejandro MD      Collected:      04/17/2017 1053        Ordering Location:   [facility]    Received:      04/17/2017 [address] [place] [address] Endoscopy                            Pathologist:      Balaji Grigsby MD                              Specimen:  Brushing, esophageal   LAB AP CYTO FINAL DIAGNOSIS (Report)     A  Brushing, esophageal, :  Negative for malignancy  Unremarkable to reactive appearing squamous cells  Abundant fungal organisms morphologically consistent with Rima spp  Satisfactory for evaluation  Electronically signed by Balaji Grigsby MD on 4/19/2017 at 10:22 AM   LAB AP GROSS DESCRIPTION      A  Brushing, esophageal, Candida brushing : 20 ml colorless clear received   in [facility]   LAB AP NONGYN ADDITIONAL INFORMATION (Report)     [employer]'s FDA approved ,  and ThinPrep Imaging System are   utilized with strict adherence to the 's instruction manual to   prepare gynecologic and non-gynecologic cytology specimens for the   production of ThinPrep slides as well as for gynecologic ThinPrep imaging     These processes have been validated by our laboratory and/or by the     These tests were developed and their performance characteristics   determined by [facility] or [address] [place] [address]  They may not be cleared or approved by the [place]  Food and   Drug Administration  The FDA has determined that such clearance or   approval is not necessary  These tests are used for clinical purposes  They should not be regarded as investigational or for research  This   laboratory has been approved by [place] [address], designated as a high-complexity   laboratory and is qualified to perform these tests  - Interpretation performed at [address] 18     (1) TISSUE EXAM 17Arh3677 10:49AM Coral Johnson     Test Name Result Flag Reference   LAB AP CASE REPORT (Report)     Surgical Pathology Report             Case: [id number]                   Authorizing Provider: Nile Cabrera MD      Collected:      04/17/2017 1049        Ordering Location:   [facility]    Received:      04/17/2017 [address] [place] [address] Endoscopy                            Pathologist:      Yrn Wright MD                                 Specimens:  A) - Stomach, Stomach biopsy, r/o H  pylori, erosive gastritis                     B) - Duodenum, Duodenum biopsy duodenitis                               C) - Large Intestine, Cecum, Cecal polyp                                D) - Large Intestine, Right/Ascending Colon, Ascending colon polyp   LAB AP FINAL DIAGNOSIS (Report)     A  Stomach, erosive gastritis (biopsy):    - H  pylori associated chronic inactive gastritis involving antral   mucosa  - Immunostain for H  pylori (with appropriate control) is positive  - No intestinal metaplasia (AB/PAS with appropriate control), dysplasia   or neoplasia identified      B  Duodenum, duodenitis (biopsy):    - Chronic duodenitis with associated partial villous blunting (see note)     - No granulomas, dysplasia or neoplasia identified  C  Cecal polyp (biopsy):    - Polypoid colonic mucosa with lymphoid aggregate formation     - No high-grade dysplasia or malignancy identified  D  Ascending colon polyp (hot snare):    - Portions of tubular adenoma  - No high-grade dysplasia or malignancy identified  Electronically signed by Lary Cuevas MD on 4/19/2017 at 10:40 AM   LAB AP NOTE (Report)     - The duodenal findings are likely secondary to the patient's Helicobacter   infection, however correlation with celiac serologic studies may be   considered as clinically indicated  - Interpretation performed at [facility], [address] [place] [address],   [address] [facility]  - Intradepartmental consultation concurs with the diagnosis  LAB AP SURGICAL ADDITIONAL INFORMATION (Report)     These tests were developed and their performance characteristics   determined by [facility] or [facility]  They may not be cleared or approved by the [place]  Food and   Drug Administration  The FDA has determined that such clearance or   approval is not necessary  These tests are used for clinical purposes  They should not be regarded as investigational or for research  This   laboratory has been approved by CLIA 88, designated as a high-complexity   laboratory and is qualified to perform these tests  LAB AP GROSS DESCRIPTION (Report)     A  The specimen is received in formalin, labeled with the patient's name   and hospital number, and is designated Stomach biopsy rule out H  Pylori   erosive gastritis  The specimen consists of 4 tan soft tissue fragments   ranging in size from 0 2-0 4 cm in greatest dimension  Entirely submitted  One cassette  B  The specimen is received in formalin, labeled with the patient's name   and hospital number, and is designated Duodenum biopsy, duodenitis   The   specimen consists of 2 tan-pink soft tissue fragments each measuring 0 4 cm in greatest dimension  Entirely submitted  One cassette  C  The specimen is received in formalin, labeled with the patient's name   and hospital number, and is designated Cecal polyp  The specimen   consists of a tan soft tissue fragment measuring 0 4 cm in greatest   dimension  Entirely submitted  One cassette  D  The specimen is received in formalin, labeled with the patient's name   and hospital number, and is designated Ascending colon polyp  The   specimen consists of multiple tan soft tissue fragments measuring 1 2 x   0 6 x 0 3 cm in aggregate  Entirely submitted  One cassette  Note: The estimated total formalin fixation time based upon information   provided by the submitting clinician and the standard processing schedule   is 15 hours  [address] AP CLINICAL INFORMATION      Polyp of colon  ???  Gastro-esophageal reflux disease without esophagitis  Plan  Acid reflux    · PriLOSEC OTC 20 MG Oral Tablet Delayed Release  H  pylori infection    · Amoxicillin 500 MG Oral Capsule; TAKE 2 CAPSULES TWICE DAILY UNTIL GONE   · Clarithromycin 500 MG Oral Tablet; TAKE 1 TABLET EVERY 12 HOURS DAILY   · (1) HELICOBACTER PYLORI ANTIGEN, STOOL; Status:Active;  Requested  for:22Apr2017;

## 2018-01-12 NOTE — RESULT NOTES
Message  I spoke with patient this evening and reviewed MRI of left shoulder multiple abnormal findings see report plan orthopedic surgery referral       Results/Data  Results    * MRI SHOULDER LEFT WO CONTRAST   MRI SHOULDER LEFT WO CONTRAST: This is a summary report  The complete report  is available in the patient's medical record  If you cannot access the medical record,  please contact the sending organization for a detailed fax or copy  MRI LEFT SHOULDER     INDICATION: Left shoulder pain  Patient fell over one month ago  COMPARISON: Left shoulder plain films from 3/4/2016  TECHNIQUE:   The following MR sequences were obtained of the left shoulder:  Localizer, axial GRE/PD fat sat, oblique coronal T2 fat sat, oblique sagittal T1/T2 fat sat  Images were acquired on a 1 5 Vivian unit  Gadolinium was not used  FINDINGS:     SUBCUTANEOUS TISSUES: Normal     JOINT EFFUSION: None  ACROMION PROCESS: There is a large subacromial spur  (Series 5 image 14 )     ROTATOR CUFF: There is a full-thickness anterior leading edge tear of the  supraspinatus, spanning 1 7 cm anterior posterior, with torn tendon edge retracted  minimally by 0 9 cm  No muscle atrophy  (Series 4 images 11  through 13, series 6 image 19 )     The infraspinatus, subscapularis, and teres minor are normal      SUBACROMIAL/SUBDELTOID BURSA: There is a small subacromial/subdeltoid  bursitis  LONG HEAD OF BICEPS TENDON: There is mild long head of biceps tenosynovitis  without tear  GLENOID LABRUM: There is a SLAP tear of the glenoid labrum extending from the  posterior-superior labrum at the 1 o'clock position, counterclockwise to the  anterior-superior labrum at the 11 o'clock position  (Series 4 images 11  through 13 )  There is a    separate small posterior glenoid labral tear (series 3 image 15 )     GLENOHUMERAL JOINT: Intact  ACROMIOCLAVICULAR JOINT:  There is moderate osteoarthritis       BONE MARROW SIGNAL: Normal        IMPRESSION:     There is a full-thickness anterior leading edge tear of the supraspinatus, spanning 1 7  cm anterior posterior, with torn tendon edge retracted minimally by 0 9 cm  No muscle  atrophy  (Series 4 images 11 through 13, series 6 image 19 )     Moderate acromioclavicular joint osteoarthritis, and large subacromial spur (series 5  image 14 )     There is a SLAP tear of the glenoid labrum extending from the posterior-superior labrum  at the 1 o'clock position, counterclockwise to the anterior-superior labrum at the 11  o'clock position    (Series 4 images 11 through 13 )      There is a separate small posterior glenoid labral tear (series 3 image 15 )       Workstation performed: [id number]     Signed by:   Aileen Bui MD   4/4/16     Signatures   Electronically signed by : VANCE Alfred ; Apr 6 2016  5:18PM EST                       (Author)

## 2018-01-13 VITALS
HEART RATE: 80 BPM | RESPIRATION RATE: 16 BRPM | DIASTOLIC BLOOD PRESSURE: 72 MMHG | HEIGHT: 72 IN | TEMPERATURE: 98 F | WEIGHT: 183 LBS | SYSTOLIC BLOOD PRESSURE: 140 MMHG | BODY MASS INDEX: 24.79 KG/M2

## 2018-01-13 NOTE — RESULT NOTES
Message  Ana Rashawn i have enclosed a copy of your repeat labs  repeat creatinine or kidney function is now normal at 1 25  normal 0 6 to 1 30  previously 1 51  calcium mildly elevated at 10  3  repeat hemoglobin or red cell count 12  8  normal 12 to 17  previously 12  9  i did review your upper endoscopy and colonoscopy reports  plan going forward no Ibuprofen  I would repeat labs in one month  if you are taking any calcium I would stop  Plan  Anemia, Elevated serum creatinine    · (1) BASIC METABOLIC PROFILE; Status:Active; Requested HNC:[id number];    · (1) CBC/PLT/DIFF; Status:Active; Requested NZT:[id number];    · (1) IRON PANEL; Status:Active; Requested BWH:[id number];    · (1) PTH N-TERMINAL (INTACT); Status:Active; Requested for:[id number];     Results/Data     (1) CBC/PLT/DIFF   WBC COUNT: 4 61 Thousand/uL Reference Range 4 31-10 16  RBC COUNT: 4 88 Million/uL Reference Range 3 88-5 62  HEMOGLOBIN: 12 8 g/dL Reference Range 12 0-17 0  HEMATOCRIT: 39 9 % Reference Range 36 5-49 3  MCV: 82 fL Reference Range 82-98  MCH: 26 2 pg Abnormal Low Reference Range 26 8-34 3  MCHC: 32 1 g/dL Reference Range 31 4-37 4  RDW: 14 7 % Reference Range 11 6-15 1  MPV: 10 3 fL Reference Range 8 9-12 7  PLATELET COUNT: 575 Thousands/uL Reference Range 149-390  nRBC AUTOMATED: 0 /100 WBCs  NEUTROPHILS RELATIVE PERCENT: 51 % Reference Range 43-75  LYMPHOCYTES RELATIVE PERCENT: 31 % Reference Range 14-44  MONOCYTES RELATIVE PERCENT: 13 % Abnormal High Reference Range 4-12  EOSINOPHILS RELATIVE PERCENT: 4 % Reference Range 0-6  BASOPHILS RELATIVE PERCENT: 1 % Reference Range 0-1  NEUTROPHILS ABSOLUTE COUNT: 2 37 Thousands/? ??L Reference Range 1 85-7 62  LYMPHOCYTES ABSOLUTE COUNT: 1 43 Thousands/? ??L Reference Range 0 60-4 47  MONOCYTES ABSOLUTE COUNT: 0 61 Thousand/? ??L Reference Range 0 17-1 22  EOSINOPHILS ABSOLUTE COUNT: 0 16 Thousand/? ??L Reference Range 0 00-0 61  BASOPHILS ABSOLUTE COUNT: 0 04 Thousands/? ??L Reference Range 0  00-0 10  (1) BASIC METABOLIC PROFILE   SODIUM: 140 mmol/L Reference Range 136-145  POTASSIUM: 3 8 mmol/L Reference Range 3 5-5 3  CHLORIDE: 106 mmol/L Reference Range 100-108  CARBON DIOXIDE: 29 mmol/L Reference Range 21-32  ANION GAP (CALC): 5 mmol/L Reference Range 4-13  BLOOD UREA NITROGEN: 10 mg/dL Reference Range 5-25  CREATININE: 1 25 mg/dL Reference Range 0 60-1 30  GLUCOSE FASTIN mg/dL Reference Range 65-99  CALCIUM: 10 3 mg/dL Abnormal High Reference Range 8 3-10 1  eGFR Non-African American: >60 0 ml/min/1 73sq m    Signatures   Electronically signed by : VANCE Andrade ; May 11 2017  7:12AM EST                       (Author)

## 2018-01-14 VITALS
TEMPERATURE: 97.3 F | BODY MASS INDEX: 24.84 KG/M2 | HEIGHT: 72 IN | WEIGHT: 183.38 LBS | HEART RATE: 58 BPM | SYSTOLIC BLOOD PRESSURE: 106 MMHG | OXYGEN SATURATION: 99 % | DIASTOLIC BLOOD PRESSURE: 60 MMHG

## 2018-01-14 VITALS
SYSTOLIC BLOOD PRESSURE: 112 MMHG | TEMPERATURE: 98.1 F | BODY MASS INDEX: 25.26 KG/M2 | HEART RATE: 62 BPM | RESPIRATION RATE: 16 BRPM | WEIGHT: 186.5 LBS | DIASTOLIC BLOOD PRESSURE: 60 MMHG | HEIGHT: 72 IN

## 2018-01-15 NOTE — MISCELLANEOUS
Message  I spoke with patient this evening and reviewed his labs  Hemoglobin 12 9 decreased from 15 8 year ago  Creatinine 1 51  Creatinine from March last year 1 32  He is scheduled for an upper and lower endoscopy  He is no longer taking Ibuprofen  Plan recheck CBC and renal function in 3-4 weeks  No NSAIDs  Plan  Anemia, Elevated serum creatinine    · (1) BASIC METABOLIC PROFILE; Status:Active; Requested for:12Apr2017;    · (1) CBC/PLT/DIFF; Status:Active;  Requested for:12Apr2017;     Results/Data     (1) CBC/PLT/DIFF   WBC COUNT: 6 45 Thousand/uL Reference Range 4 31-10 16  RBC COUNT: 4 80 Million/uL Reference Range 3 88-5 62  HEMOGLOBIN: 12 9 g/dL Reference Range 12 0-17 0  HEMATOCRIT: 39 6 % Reference Range 36 5-49 3  MCV: 83 fL Reference Range 82-98  MCH: 26 9 pg Reference Range 26 8-34 3  MCHC: 32 6 g/dL Reference Range 31 4-37 4  RDW: 15 0 % Reference Range 11 6-15 1  MPV: 10 0 fL Reference Range 8 9-12 7  PLATELET COUNT: 975 Thousands/uL Reference Range 149-390  nRBC AUTOMATED: 0 /100 WBCs  NEUTROPHILS - REL: 41 % Abnormal Low Reference Range 43-75  LYMPHOCYTES - REL: 32 % Reference Range 14-44  MONOCYTES - REL: 17 % Abnormal High Reference Range 4-12  EOSINOPHILS - REL: 4 % Reference Range 0-6  BASOPHILS - REL: 2 % Abnormal High Reference Range 0-1  ATYPICAL LYMPH: 4 % Abnormal High Reference Range <=0  NEUTROPHILS ABS: 2 64 Thousand/uL Reference Range 1 85-7 62  LYMPHOTCYTES ABS: 2 06 Thousand/uL Reference Range 0 60-4 47  MONOCYTES ABS: 1 10 Thousand/uL Reference Range 0 00-1 22  EOSINOPHILS ABS: 0 26 Thousand/uL Reference Range 0 00-0 40  BASOPHILS ABS: 0 13 Thousand/uL Abnormal High Reference Range 0 00-0 10  TOTAL COUNTED:    RBC MORPHOLOGY: Normal   PLT ESTIMATE: Adequate  Reference Range Adequate  (1) COMPREHENSIVE METABOLIC PANEL   SODIUM: 160 mmol/L Reference Range 136-145  POTASSIUM: 4 0 mmol/L Reference Range 3 5-5 3  CHLORIDE: 105 mmol/L Reference Range 100-108  CARBON DIOXIDE: 31 mmol/L Reference Range 21-32  ANION GAP (CALC): 3 mmol/L Abnormal Low Reference Range 4-13  BLOOD UREA NITROGEN: 11 mg/dL Reference Range 5-25  CREATININE: 1 51 mg/dL Abnormal High Reference Range 0 60-1 30  GLUCOSE FASTIN mg/dL Reference Range 65-99  CALCIUM: 10 0 mg/dL Reference Range 8 3-10 1  AST(SGOT): 15 U/L Reference Range 5-45  ALT (SGPT): 27 U/L Reference Range 12-78  ALK PHOSPHATAS: 87 U/L Reference Range   TOTAL PROTEIN: 7 0 g/dL Reference Range 6 4-8 2  ALBUMIN: 3 3 g/dL Abnormal Low Reference Range 3 5-5 0  BILI, TOTAL: 0 70 mg/dL Reference Range 0 20-1 00  eGFR Non-: 57 4 ml/min/1 73sq m  (1) TSH   TSH: 0 554 uIU/mL Reference Range 0 358-3 740  (1) PSA (SCREEN) (Dx V76 44 Screen for Prostate Cancer)   PROSTATE SPECIFIC ANTIGEN: 0 3 ng/mL Reference Range 0 0-4 0    Signatures   Electronically signed by : VANCE Vega ; 2017  6:15PM EST                       (Author)

## 2018-01-16 NOTE — PROGRESS NOTES
Assessment    1  Encounter for preventive health examination (V70 0) (Z00 00)    Plan  Degeneration of cervical intervertebral disc, Disc degeneration, lumbar, Impaired fasting  glucose, Male erectile dysfunction, Osteoarthritis of right acromioclavicular joint    · (1) CBC/PLT/DIFF; Status:Active; Requested for:01Mar2016;    · (1) COMPREHENSIVE METABOLIC PANEL; Status:Active; Requested for:01Mar2016;    · (1) LIPID PANEL, FASTING; Status:Active; Requested for:01Mar2016;    · (1) TSH; Status:Active; Requested for:01Mar2016;   Left shoulder pain    · * MRI SHOULDER LEFT WO CONTRAST; Status:Need Information - Financial  Authorization; Requested for:01Mar2016;    · * XR SHOULDER 2+ VIEW LEFT; Status:Active; Requested for:01Mar2016;   Left shoulder pain, Osteoarthritis of right acromioclavicular joint    · Celecoxib 200 MG Oral Capsule; TAKE 1 CAPSULE Daily  Left wrist pain    · XR WRIST 2 VIEW LEFT; Status:Active; Requested for:01Mar2016;   Osteoarthritis    · Ibuprofen 800 MG Oral Tablet; take 1 tablet 3 times a day as needed  Screening for prostate cancer    · (1) PSA (SCREEN) (Dx V76 44 Screen for Prostate Cancer); Status:Active; Requested  for:01Mar2016;     Discussion/Summary  Impression: health maintenance visit  Currently, he eats a healthy diet and has an adequate exercise regimen  Prostate cancer screening: PSA was ordered  Colorectal cancer screening: colonoscopy has been ordered  Screening lab work includes glucose and lipid profile  The risks and benefits of immunizations were discussed  Advice and education were given regarding nutrition, aerobic exercise, weight loss and cardiovascular risk reduction  Repeat labs  needs colonoscopy  I suggested changing his NSAID from Ibuprofen to Celebrex  x rays left wrist and left shoulder  if negative proceed with MRI left shoulder  History of Present Illness  HM, Adult Male: The patient is being seen for a health maintenance evaluation   The last health maintenance visit was > 1 year(s) ago  Social History: Household members include spouse  He is   Work status: working full time  The patient has never smoked cigarettes  He reports occasional alcohol use  General Health: The patient's health since the last visit is described as good  He has regular dental visits  He denies vision problems  He denies hearing loss  Lifestyle:  He consumes a diverse and healthy diet  He does not have any weight concerns  He exercises regularly  Screening: Prostate cancer screening includes last prostate-specific antigen testing 02/2014 and last digital rectal examination 02/2014  Colorectal cancer screening includes a colonoscopy within the past ten years  Metabolic screening includes lipid profile performed 02/2014, glucose screening performed 51/8260 and uncertain timing of his last thyroid function test      Cardiovascular risk factors: family history of cardiovascular disease  General health risks: previous colon polyps  HPI: 62year old male, here for wellness exam  past medical history and active problems  see chart  Medications reviewed  Labs 2/2014  CBC normal hemoglobins 13 6  Chemistry profile  FBS 90, sodium 138, potassium 4 2, chloride 105, bicarbonate 30, BUN 13, creatinine 1 27, calcium 10 1  LFTs normal lipid profile, cholesterol 160, TGs 55 HDL 42,       Review of Systems    Constitutional: recent weight loss and 6 lb weight loss from 12/2015  Eyes: no eyesight problems  Cardiovascular: no chest pain, no palpitations and no extremity edema  Respiratory: no cough, no orthopnea, no wheezing, no shortness of breath during exertion and no PND  Gastrointestinal: bloody stools and intermittent episodes of rectal bleeding  symptoms usually occurs after taking NSAIDs  last colonoscopy > 5 years ago  no pain with defecation  , but no abdominal pain, no nausea, no vomiting, no constipation and no diarrhea  Genitourinary: ED uses prn Viagra  , but no urinary hesitancy and no nocturia  Musculoskeletal: arthralgias and osteoarthritis uses prn Ibuprofen 800 mg  he tried Naprosyn but less effective  s/p fall 2 weeks ago landing on left wrist and left elbow  persistent pain left wrist and left shoulder pain  x rays shoulders 12/2015 right shoulder normal  left shoulder mild AC OA  Integumentary: no rashes and no skin lesions  Neurological: no headache and no dizziness  Psychiatric: no anxiety and no depression  Endocrine: no muscle weakness  Active Problems    1  Degeneration of cervical intervertebral disc (722 4) (M50 90)   2  Disc degeneration, lumbar (722 52) (M51 36)   3  Impaired fasting glucose (790 21) (R73 01)   4  Left shoulder pain (719 41) (M25 512)   5  Left wrist pain (719 43) (M25 532)   6  Male erectile dysfunction (607 84) (N52 9)   7  Osteoarthritis (715 90) (M19 90)   8  Osteoarthritis of right acromioclavicular joint (715 91) (M19 011)   9  Screening for prostate cancer (V76 44) (Z12 5)    Past Medical History    · History of Benign Polyps Of The Large Intestine (V12 72)   · History of sickle cell trait (V12 3) (Z86 2)    Surgical History    · History of Inguinal Hernia Repair    Family History    · Family history of Diabetes Mellitus (V18 0)    · Family history of Coronary Artery Disease (V17 49)   · Family history of Essential Hypertension    Social History    · Never A Smoker    Current Meds   1  Ibuprofen 800 MG Oral Tablet; take 1 tablet 3 times a day as needed; Therapy: 81HBS5202 to (Evaluate:11Jan2016)  Requested for: 37TND3755; Last   Rx:[id number] Ordered   2  Naproxen 500 MG Oral Tablet; one tablet twice a day; Therapy: 52Kdv7009 to (Complete:19Jun2016)  Requested for: 43Kfp7591; Last   Rx:[id number] Ordered   3  Viagra 50 MG Oral Tablet; as directed; Therapy: 04NUF6056 to (ANYCKXNN:74CUF6177)  Requested for: 06Mfe0541; Last   Rx:[id number] Ordered    Allergies    1   No Known Drug Allergies    Vitals   Recorded: 73XBL3790 07:01PM   Temperature 98 5 F, Tympanic   Heart Rate 74   Respiration 16   Systolic 346, LUE, Sitting   Diastolic 74, LUE, Sitting   Height 6 ft    Weight 195 lb 2 08 oz   BMI Calculated 26 46   BSA Calculated 2 11     Physical Exam    Constitutional   General appearance: No acute distress, well appearing and well nourished  Eyes   Conjunctiva and lids: No erythema, swelling or discharge  Pupils and irises: Equal, round, reactive to light  Ophthalmoscopic examination: Normal fundi and optic discs  Ears, Nose, Mouth, and Throat   Otoscopic examination: Tympanic membranes translucent with normal light reflex  Canals patent without erythema  Oropharynx: Normal with no erythema, edema, exudate or lesions  Neck   Neck: Supple, symmetric, trachea midline, no masses  Thyroid: Normal, no thyromegaly  There were no thyroid nodules  Pulmonary   Auscultation of lungs: Clear to auscultation  Cardiovascular   Auscultation of heart: Normal rate and rhythm, normal S1 and S2, no murmurs  Heart sounds: no gallop heard  Carotid pulses: 2+ bilaterally  no bruit heard over the right carotid and no bruit heard over the left carotid  Abdominal aorta: Normal   Abdominal aorta: no bruit heard  Examination of extremities for edema and/or varicosities: Normal     Abdomen   Abdomen: Non-tender, no masses  Liver and spleen: No hepatomegaly or splenomegaly  Lymphatic   Palpation of lymph nodes in neck: No lymphadenopathy  Musculoskeletal   Gait and station: Normal     Inspection/palpation of joints, bones, and muscles: Abnormal   mild tenderness left anterior shoulder  no swelling  no warmth or redness  no ecchymosis  negative cross arm test  negative Speed test  negative empty can sign  +/- sulcus sign left shoulder  + [place] test  negative drop arm test  mild tenderness over left ulnar styloid process  no swelling  no ecchymosis     Range of motion: Normal   Range of Motion: Right Shoulder: Full Left Shoulder: Full  Right Elbow: Full  Left Elbow: Full  Right Wrist: Full  Left Wrist: Full  Muscle strength/tone: Normal   Motor Strength Findings: normal upper extremity strength  Skin   Skin and subcutaneous tissue: Normal without rashes or lesions  Neurologic   Reflexes: 2+ and symmetric  Psychiatric   Mood and affect: Normal        Results/Data  * Shoulder Xray 2> View [id number] 06:41PM Imagry     Test Name Result Flag Reference   XR Shoulder 2> View (Report)     [facility];;[address] [place] [address];;[place];[id number]   12/23/2015 1843 12/23/2015 1852   3 IMAGES     RIGHT SHOULDER     INDICATION- Intermittent right shoulder pain x6 months  COMPARISON- None     VIEWS- 3& 3 images^ 3 images     FINDINGS-     There is no acute fracture or dislocation  No degenerative changes  No lytic or blastic lesions are seen  Soft tissues are unremarkable  IMPRESSION-     No acute osseous abnormality  Transcribed on- [id number]     ZARINA Hoyos MD   Reading Radiologist- ZARINA Ding MD   Electronically Signed- ZARINA Ding MD   Released Date Time- 12/24/15 0918   ------------------------------------------------------------------------------   [id number]^JOSELINE Elias   [id number]^JOSELINE Elias     * Shoulder Xray 2> View [id number] 06:41PM Imagry     Test Name Result Flag Reference   XR Shoulder 2> View (Report)     [facility];;[address] [place] [address] Road;;[place];[id number]   12/23/2015 1843 12/23/2015 1852   3 IMAGES     LEFT SHOULDER     INDICATION- Intermittent left shoulder pain x6 months  COMPARISON- None     VIEWS- 3& 3 images^ 3 images     FINDINGS-     There is no acute fracture or dislocation  Mild degenerative changes in the acromioclavicular joint  No lytic or blastic lesions are seen  Soft tissues are unremarkable  IMPRESSION-     No acute osseous abnormality   Mild degenerative changes in the   acromioclavicular joint         Transcribed on- [id number]     ZARINA Doherty MD   Reading Radiologist- ZARINA Gallo MD   Electronically Signed- ZARINA Gallo MD   Released Date Time- 12/24/15 0919   ------------------------------------------------------------------------------   [id number]^JOSELINE TARIQ [address] [place] [address] Appointments    Date/Time Provider Specialty Site   03/07/2017 07:00 PM VANCE Brown , MD, [facility] [address] [facility],[address]     Signatures   Electronically signed by : JANINE Barajas MD; Mar  2 2016 12:59PM EST                       (Author)

## 2018-06-05 ENCOUNTER — OFFICE VISIT (OUTPATIENT)
Dept: FAMILY MEDICINE CLINIC | Facility: CLINIC | Age: 63
End: 2018-06-05
Payer: COMMERCIAL

## 2018-06-05 VITALS
BODY MASS INDEX: 24.39 KG/M2 | SYSTOLIC BLOOD PRESSURE: 118 MMHG | HEART RATE: 58 BPM | WEIGHT: 184 LBS | HEIGHT: 73 IN | DIASTOLIC BLOOD PRESSURE: 80 MMHG | TEMPERATURE: 97.5 F

## 2018-06-05 DIAGNOSIS — M19.011 OSTEOARTHRITIS OF RIGHT ACROMIOCLAVICULAR JOINT: ICD-10-CM

## 2018-06-05 DIAGNOSIS — K21.9 GASTROESOPHAGEAL REFLUX DISEASE WITHOUT ESOPHAGITIS: ICD-10-CM

## 2018-06-05 DIAGNOSIS — N52.9 ERECTILE DYSFUNCTION, UNSPECIFIED ERECTILE DYSFUNCTION TYPE: ICD-10-CM

## 2018-06-05 DIAGNOSIS — E78.00 HYPERCHOLESTEREMIA: ICD-10-CM

## 2018-06-05 DIAGNOSIS — Z12.5 SCREENING FOR PROSTATE CANCER: ICD-10-CM

## 2018-06-05 DIAGNOSIS — Z00.00 WELL ADULT EXAM: Primary | ICD-10-CM

## 2018-06-05 DIAGNOSIS — M51.36 DISC DEGENERATION, LUMBAR: ICD-10-CM

## 2018-06-05 DIAGNOSIS — M50.30 DDD (DEGENERATIVE DISC DISEASE), CERVICAL: ICD-10-CM

## 2018-06-05 PROBLEM — K64.9 HEMORRHOID: Status: ACTIVE | Noted: 2017-04-17

## 2018-06-05 PROBLEM — A04.8 H. PYLORI INFECTION: Status: ACTIVE | Noted: 2017-04-22

## 2018-06-05 PROBLEM — M54.16 RIGHT LUMBAR RADICULOPATHY: Status: ACTIVE | Noted: 2017-10-25

## 2018-06-05 PROBLEM — M75.120 FULL THICKNESS ROTATOR CUFF TEAR: Status: ACTIVE | Noted: 2018-06-05

## 2018-06-05 PROCEDURE — 99396 PREV VISIT EST AGE 40-64: CPT | Performed by: FAMILY MEDICINE

## 2018-06-05 RX ORDER — OMEPRAZOLE 20 MG/1
20 CAPSULE, DELAYED RELEASE ORAL DAILY
Qty: 90 CAPSULE | Refills: 3 | Status: SHIPPED | OUTPATIENT
Start: 2018-06-05 | End: 2018-09-03

## 2018-06-05 RX ORDER — IBUPROFEN 800 MG/1
1 TABLET ORAL AS NEEDED
COMMUNITY
End: 2018-06-05 | Stop reason: SDUPTHER

## 2018-06-05 RX ORDER — IBUPROFEN 800 MG/1
800 TABLET ORAL 3 TIMES DAILY
Qty: 270 TABLET | Refills: 3 | Status: SHIPPED | OUTPATIENT
Start: 2018-06-05 | End: 2019-03-02

## 2018-06-05 RX ORDER — CELECOXIB 200 MG/1
200 CAPSULE ORAL DAILY
Qty: 90 CAPSULE | Refills: 3 | Status: SHIPPED | OUTPATIENT
Start: 2018-06-05 | End: 2018-09-03

## 2018-06-05 NOTE — PROGRESS NOTES
Assessment/Plan:     Diagnoses and all orders for this visit:    Well adult exam    Osteoarthritis of right acromioclavicular joint  -     CBC and differential  -     Comprehensive metabolic panel  -     ibuprofen (MOTRIN) 800 mg tablet; Take 1 tablet (800 mg total) by mouth 3 (three) times a day for 270 days  -     celecoxib (CeleBREX) 200 mg capsule; Take 1 capsule (200 mg total) by mouth daily for 90 days    Disc degeneration, lumbar  -     ibuprofen (MOTRIN) 800 mg tablet; Take 1 tablet (800 mg total) by mouth 3 (three) times a day for 270 days  -     celecoxib (CeleBREX) 200 mg capsule; Take 1 capsule (200 mg total) by mouth daily for 90 days    DDD (degenerative disc disease), cervical  -     celecoxib (CeleBREX) 200 mg capsule; Take 1 capsule (200 mg total) by mouth daily for 90 days    Erectile dysfunction, unspecified erectile dysfunction type    Hypercholesteremia  -     Lipid panel    Gastroesophageal reflux disease without esophagitis  -     omeprazole (PriLOSEC) 20 mg delayed release capsule; Take 1 capsule (20 mg total) by mouth daily for 90 days    Screening for prostate cancer  -     PSA, Total Screen; Future            Repeat labs  Try to limit use of Ibuprofen given past history of gastritis  he is going to try Celebrex  Patient ID: Lolita Patterson is a 65 y o  male  65year old male here for wellness exam  active medical problem and PMH  see chart  medications reviewed  Labs 04/2017 chemistry normal except for creatinine 1 51 increased from 1 32 in 2016  CBC Hgb 12 9  CBC 03/2016 Hgb 15  8  patient was seen 04/2017 with loss of appetite, early satiety and intermittent rectal bleeding  he had been on Ibuprofen 800 mg prn for osteoarthritis  he underwent EGD/colonoscopy 04/2017  EGD erosive gastritis  + duodenitis in duodenal bulb  + H pylori  he was treated with antibiotics and Omeprazole  colonoscopy polyps  + internal hemorrhoids  rectal bleeding resolved with the use of suppositories  12/2017 Hgb 14 9  Creatinine 1 26  Electrolytes normal except for Ca++ 10 5  He has using prn Ibuprofen 800 mg with prn Omeprazole  The following portions of the patient's history were reviewed and updated as appropriate: allergies, current medications, past family history, past medical history, past social history, past surgical history and problem list     Review of Systems   Constitutional: Negative for appetite change, chills, fever and unexpected weight change  HENT: Negative for congestion, ear pain, postnasal drip, rhinorrhea, sore throat and trouble swallowing  Eyes: Negative for visual disturbance  Respiratory: Negative for cough, shortness of breath and wheezing  Cardiovascular: Negative for chest pain, palpitations and leg swelling  Gastrointestinal: Negative for abdominal pain, blood in stool, constipation, diarrhea, nausea and vomiting  See HPI   Genitourinary: Negative for difficulty urinating  04/2017 PSA 0 3  Status post right inguinal herniorrhaphy 01/2018   Musculoskeletal: Positive for arthralgias  Negative for myalgias  Generalized OA  Uses prn Ibuprofen  Skin: Negative for rash  Allergic/Immunologic: Negative for environmental allergies  Neurological: Negative for dizziness and headaches  Hematological: Negative for adenopathy  Does not bruise/bleed easily  Psychiatric/Behavioral: Negative for dysphoric mood and sleep disturbance  Objective:      /80 (BP Location: Left arm, Patient Position: Sitting, Cuff Size: Large)   Pulse 58   Temp 97 5 °F (36 4 °C)   Ht 6' 1" (1 854 m)   Wt 83 5 kg (184 lb)   BMI 24 28 kg/m²          Physical Exam   Constitutional: He is oriented to person, place, and time  He appears well-developed and well-nourished  No distress     HENT:   Right Ear: Tympanic membrane normal    Left Ear: Tympanic membrane normal    Mouth/Throat: Oropharynx is clear and moist    Eyes: Conjunctivae and EOM are normal  Pupils are equal, round, and reactive to light  No scleral icterus  Funduscopic exam normal   Neck: Normal range of motion  Neck supple  No JVD present  Carotid bruit is not present  No tracheal deviation present  No thyroid mass and no thyromegaly present  Cardiovascular: Normal rate, regular rhythm and normal heart sounds  Exam reveals no gallop  No murmur heard  Pulses:       Carotid pulses are 2+ on the right side, and 2+ on the left side  Pulmonary/Chest: Effort normal and breath sounds normal  No respiratory distress  He has no wheezes  He has no rales  Abdominal: Soft  Bowel sounds are normal  He exhibits no distension and no mass  There is no hepatosplenomegaly  There is no tenderness  There is no rebound and no guarding  Genitourinary: Prostate normal    Genitourinary Comments:  No prostate nodules   Musculoskeletal: Normal range of motion  He exhibits deformity  He exhibits no edema  Mild varus deformity of right knee  Mild right medial joint line tenderness  No effusion  No warmth or redness  Lymphadenopathy:     He has no cervical adenopathy  Neurological: He is alert and oriented to person, place, and time  No cranial nerve deficit  Skin: No rash noted  Psychiatric: He has a normal mood and affect  Nursing note and vitals reviewed        Recent Results (from the past 5040 hour(s))   CBC and differential    Collection Time: 12/14/17  5:47 PM   Result Value Ref Range    WBC 8 22 4 31 - 10 16 Thousand/uL    RBC 5 03 3 88 - 5 62 Million/uL    Hemoglobin 14 9 12 0 - 17 0 g/dL    Hematocrit 44 1 36 5 - 49 3 %    MCV 88 82 - 98 fL    MCH 29 6 26 8 - 34 3 pg    MCHC 33 8 31 4 - 37 4 g/dL    RDW 13 9 11 6 - 15 1 %    MPV 9 6 8 9 - 12 7 fL    Platelets 019 449 - 306 Thousands/uL    nRBC 0 /100 WBCs    Neutrophils Relative 59 43 - 75 %    Lymphocytes Relative 30 14 - 44 %    Monocytes Relative 8 4 - 12 %    Eosinophils Relative 3 0 - 6 %    Basophils Relative 0 0 - 1 %    Neutrophils Absolute 4 80 1 85 - 7 62 Thousands/µL    Lymphocytes Absolute 2 46 0 60 - 4 47 Thousands/µL    Monocytes Absolute 0 69 0 17 - 1 22 Thousand/µL    Eosinophils Absolute 0 22 0 00 - 0 61 Thousand/µL    Basophils Absolute 0 03 0 00 - 0 10 Thousands/µL   Basic metabolic panel    Collection Time: 12/14/17  5:47 PM   Result Value Ref Range    Sodium 138 136 - 145 mmol/L    Potassium 4 3 3 5 - 5 3 mmol/L    Chloride 108 100 - 108 mmol/L    CO2 28 21 - 32 mmol/L    Anion Gap 2 (L) 4 - 13 mmol/L    BUN 12 5 - 25 mg/dL    Creatinine 1 26 0 60 - 1 30 mg/dL    Glucose 101 65 - 140 mg/dL    Calcium 10 5 (H) 8 3 - 10 1 mg/dL    eGFR 71 ml/min/1 73sq m   CA / ALB with Ca correction    Collection Time: 12/14/17  5:47 PM   Result Value Ref Range    Albumin 3 4 (L) 3 5 - 5 0 g/dL    Corrected Calcium 11 0 (H) 8 3 - 10 1 mg/dL    CALCIUM FOR CA/ALB 10 5 (H) 8 3 - 10 1 mg/dL   ECG 12 lead    Collection Time: 12/21/17  6:21 PM   Result Value Ref Range    Ventricular Rate 71 BPM    Atrial Rate 71 BPM    WY Interval 178 ms    QRSD Interval 82 ms    QT Interval 370 ms    QTC Interval 402 ms    P Axis 80 degrees    QRS Axis 40 degrees    T Wave Axis 24 degrees

## 2018-06-23 ENCOUNTER — TRANSCRIBE ORDERS (OUTPATIENT)
Dept: ADMINISTRATIVE | Facility: HOSPITAL | Age: 63
End: 2018-06-23

## 2018-06-23 ENCOUNTER — APPOINTMENT (OUTPATIENT)
Dept: LAB | Facility: MEDICAL CENTER | Age: 63
End: 2018-06-23
Payer: COMMERCIAL

## 2018-06-23 DIAGNOSIS — Z12.5 SCREENING FOR PROSTATE CANCER: ICD-10-CM

## 2018-06-23 LAB
ALBUMIN SERPL BCP-MCNC: 3.4 G/DL (ref 3.5–5)
ALP SERPL-CCNC: 72 U/L (ref 46–116)
ALT SERPL W P-5'-P-CCNC: 31 U/L (ref 12–78)
ANION GAP SERPL CALCULATED.3IONS-SCNC: 2 MMOL/L (ref 4–13)
AST SERPL W P-5'-P-CCNC: 15 U/L (ref 5–45)
BASOPHILS # BLD AUTO: 0.04 THOUSANDS/ΜL (ref 0–0.1)
BASOPHILS NFR BLD AUTO: 1 % (ref 0–1)
BILIRUB SERPL-MCNC: 0.5 MG/DL (ref 0.2–1)
BUN SERPL-MCNC: 9 MG/DL (ref 5–25)
CALCIUM SERPL-MCNC: 10 MG/DL (ref 8.3–10.1)
CHLORIDE SERPL-SCNC: 106 MMOL/L (ref 100–108)
CHOLEST SERPL-MCNC: 124 MG/DL (ref 50–200)
CO2 SERPL-SCNC: 31 MMOL/L (ref 21–32)
CREAT SERPL-MCNC: 1.2 MG/DL (ref 0.6–1.3)
EOSINOPHIL # BLD AUTO: 0.25 THOUSAND/ΜL (ref 0–0.61)
EOSINOPHIL NFR BLD AUTO: 6 % (ref 0–6)
ERYTHROCYTE [DISTWIDTH] IN BLOOD BY AUTOMATED COUNT: 12.7 % (ref 11.6–15.1)
GFR SERPL CREATININE-BSD FRML MDRD: 75 ML/MIN/1.73SQ M
GLUCOSE P FAST SERPL-MCNC: 92 MG/DL (ref 65–99)
HCT VFR BLD AUTO: 46.1 % (ref 36.5–49.3)
HDLC SERPL-MCNC: 41 MG/DL (ref 40–60)
HGB BLD-MCNC: 15.3 G/DL (ref 12–17)
IMM GRANULOCYTES # BLD AUTO: 0.01 THOUSAND/UL (ref 0–0.2)
IMM GRANULOCYTES NFR BLD AUTO: 0 % (ref 0–2)
LDLC SERPL CALC-MCNC: 62 MG/DL (ref 0–100)
LYMPHOCYTES # BLD AUTO: 1.36 THOUSANDS/ΜL (ref 0.6–4.47)
LYMPHOCYTES NFR BLD AUTO: 32 % (ref 14–44)
MCH RBC QN AUTO: 30.1 PG (ref 26.8–34.3)
MCHC RBC AUTO-ENTMCNC: 33.2 G/DL (ref 31.4–37.4)
MCV RBC AUTO: 91 FL (ref 82–98)
MONOCYTES # BLD AUTO: 0.62 THOUSAND/ΜL (ref 0.17–1.22)
MONOCYTES NFR BLD AUTO: 15 % (ref 4–12)
NEUTROPHILS # BLD AUTO: 1.98 THOUSANDS/ΜL (ref 1.85–7.62)
NEUTS SEG NFR BLD AUTO: 46 % (ref 43–75)
NONHDLC SERPL-MCNC: 83 MG/DL
NRBC BLD AUTO-RTO: 0 /100 WBCS
PLATELET # BLD AUTO: 194 THOUSANDS/UL (ref 149–390)
PMV BLD AUTO: 9.9 FL (ref 8.9–12.7)
POTASSIUM SERPL-SCNC: 4.1 MMOL/L (ref 3.5–5.3)
PROT SERPL-MCNC: 6.9 G/DL (ref 6.4–8.2)
PSA SERPL-MCNC: 0.4 NG/ML (ref 0–4)
RBC # BLD AUTO: 5.08 MILLION/UL (ref 3.88–5.62)
SODIUM SERPL-SCNC: 139 MMOL/L (ref 136–145)
TRIGL SERPL-MCNC: 104 MG/DL
WBC # BLD AUTO: 4.26 THOUSAND/UL (ref 4.31–10.16)

## 2018-06-23 PROCEDURE — 80061 LIPID PANEL: CPT | Performed by: FAMILY MEDICINE

## 2018-06-23 PROCEDURE — 85025 COMPLETE CBC W/AUTO DIFF WBC: CPT | Performed by: FAMILY MEDICINE

## 2018-06-23 PROCEDURE — 36415 COLL VENOUS BLD VENIPUNCTURE: CPT | Performed by: FAMILY MEDICINE

## 2018-06-23 PROCEDURE — G0103 PSA SCREENING: HCPCS

## 2018-06-23 PROCEDURE — 80053 COMPREHEN METABOLIC PANEL: CPT | Performed by: FAMILY MEDICINE

## 2018-06-25 DIAGNOSIS — J06.9 UPPER RESPIRATORY TRACT INFECTION, UNSPECIFIED TYPE: Primary | ICD-10-CM

## 2018-06-25 RX ORDER — AZITHROMYCIN 250 MG/1
TABLET, FILM COATED ORAL
Qty: 6 TABLET | Refills: 1 | Status: SHIPPED | OUTPATIENT
Start: 2018-06-25 | End: 2018-06-30

## 2018-11-19 ENCOUNTER — TELEPHONE (OUTPATIENT)
Dept: FAMILY MEDICINE CLINIC | Facility: CLINIC | Age: 63
End: 2018-11-19

## 2018-11-19 DIAGNOSIS — J06.9 UPPER RESPIRATORY TRACT INFECTION, UNSPECIFIED TYPE: Primary | ICD-10-CM

## 2018-11-19 RX ORDER — AZITHROMYCIN 250 MG/1
TABLET, FILM COATED ORAL
Qty: 6 TABLET | Refills: 0 | Status: SHIPPED | OUTPATIENT
Start: 2018-11-19 | End: 2018-11-23

## 2018-11-19 NOTE — TELEPHONE ENCOUNTER
Pt has had sx of congestion/ha x two weeks  He has [employer] so a visit to our office would not be covered  He has not yet established with a new doctor and is asking if you would send in an abx to [facility] for him

## (undated) DEVICE — INTENDED FOR TISSUE SEPARATION, AND OTHER PROCEDURES THAT REQUIRE A SHARP SURGICAL BLADE TO PUNCTURE OR CUT.: Brand: BARD-PARKER SAFETY BLADES SIZE 15, STERILE

## (undated) DEVICE — NEEDLE 22 G X 1 1/2 SAFETY

## (undated) DEVICE — SCD SEQUENTIAL COMPRESSION COMFORT SLEEVE MEDIUM KNEE LENGTH: Brand: KENDALL SCD

## (undated) DEVICE — REM POLYHESIVE ADULT PATIENT RETURN ELECTRODE: Brand: VALLEYLAB

## (undated) DEVICE — LIGHT HANDLE COVER SLEEVE DISP BLUE STELLAR

## (undated) DEVICE — ADHESIVE SKN CLSR HISTOACRYL FLEX 0.5ML LF

## (undated) DEVICE — STRL UNIVERSAL MINOR GENERAL: Brand: CARDINAL HEALTH

## (undated) DEVICE — TOWEL SET X-RAY

## (undated) DEVICE — VIAL DECANTER

## (undated) DEVICE — SUT MONOCRYL 4-0 PS-2 27 IN Y426H

## (undated) DEVICE — GLOVE SRG BIOGEL ORTHOPEDIC 8

## (undated) DEVICE — SUT VICRYL 2-0 SH 27 IN UNDYED J417H

## (undated) DEVICE — CHLORAPREP HI-LITE 26ML ORANGE

## (undated) DEVICE — PLUMEPEN PRO 10FT